# Patient Record
Sex: FEMALE | Race: WHITE | NOT HISPANIC OR LATINO | Employment: OTHER | URBAN - METROPOLITAN AREA
[De-identification: names, ages, dates, MRNs, and addresses within clinical notes are randomized per-mention and may not be internally consistent; named-entity substitution may affect disease eponyms.]

---

## 2017-01-24 ENCOUNTER — COMMUNICATION - HEALTHEAST (OUTPATIENT)
Dept: FAMILY MEDICINE | Facility: CLINIC | Age: 62
End: 2017-01-24

## 2017-01-24 DIAGNOSIS — E03.9 HYPOTHYROIDISM, UNSPECIFIED TYPE: ICD-10-CM

## 2017-02-23 ENCOUNTER — HOSPITAL ENCOUNTER (OUTPATIENT)
Dept: MAMMOGRAPHY | Facility: HOSPITAL | Age: 62
Discharge: HOME | End: 2017-02-23
Attending: FAMILY MEDICINE

## 2017-02-23 DIAGNOSIS — Z12.31 VISIT FOR SCREENING MAMMOGRAM: ICD-10-CM

## 2017-03-13 ENCOUNTER — RECORDS - HEALTHEAST (OUTPATIENT)
Dept: ADMINISTRATIVE | Facility: OTHER | Age: 62
End: 2017-03-13

## 2017-05-05 ENCOUNTER — COMMUNICATION - HEALTHEAST (OUTPATIENT)
Dept: FAMILY MEDICINE | Facility: CLINIC | Age: 62
End: 2017-05-05

## 2017-05-05 DIAGNOSIS — E03.9 HYPOTHYROIDISM, UNSPECIFIED TYPE: ICD-10-CM

## 2017-05-09 ENCOUNTER — COMMUNICATION - HEALTHEAST (OUTPATIENT)
Dept: FAMILY MEDICINE | Facility: CLINIC | Age: 62
End: 2017-05-09

## 2017-07-11 ENCOUNTER — COMMUNICATION - HEALTHEAST (OUTPATIENT)
Dept: FAMILY MEDICINE | Facility: CLINIC | Age: 62
End: 2017-07-11

## 2017-07-11 DIAGNOSIS — E03.9 HYPOTHYROIDISM, UNSPECIFIED TYPE: ICD-10-CM

## 2017-08-10 ENCOUNTER — COMMUNICATION - HEALTHEAST (OUTPATIENT)
Dept: FAMILY MEDICINE | Facility: CLINIC | Age: 62
End: 2017-08-10

## 2017-08-10 ENCOUNTER — OFFICE VISIT - HEALTHEAST (OUTPATIENT)
Dept: FAMILY MEDICINE | Facility: CLINIC | Age: 62
End: 2017-08-10

## 2017-08-10 DIAGNOSIS — E03.9 HYPOTHYROIDISM: ICD-10-CM

## 2017-08-10 DIAGNOSIS — G56.01 CARPAL TUNNEL SYNDROME OF RIGHT WRIST: ICD-10-CM

## 2017-08-10 DIAGNOSIS — G43.909 MIGRAINE: ICD-10-CM

## 2017-08-10 ASSESSMENT — MIFFLIN-ST. JEOR: SCORE: 1181.27

## 2017-08-14 ENCOUNTER — COMMUNICATION - HEALTHEAST (OUTPATIENT)
Dept: FAMILY MEDICINE | Facility: CLINIC | Age: 62
End: 2017-08-14

## 2017-09-18 ENCOUNTER — COMMUNICATION - HEALTHEAST (OUTPATIENT)
Dept: FAMILY MEDICINE | Facility: CLINIC | Age: 62
End: 2017-09-18

## 2017-11-09 ENCOUNTER — RECORDS - HEALTHEAST (OUTPATIENT)
Dept: ADMINISTRATIVE | Facility: OTHER | Age: 62
End: 2017-11-09

## 2017-11-14 ENCOUNTER — AMBULATORY - HEALTHEAST (OUTPATIENT)
Dept: FAMILY MEDICINE | Facility: CLINIC | Age: 62
End: 2017-11-14

## 2017-11-14 ENCOUNTER — AMBULATORY - HEALTHEAST (OUTPATIENT)
Dept: LAB | Facility: CLINIC | Age: 62
End: 2017-11-14

## 2017-11-14 DIAGNOSIS — E03.9 HYPOTHYROIDISM: ICD-10-CM

## 2017-11-16 ENCOUNTER — COMMUNICATION - HEALTHEAST (OUTPATIENT)
Dept: FAMILY MEDICINE | Facility: CLINIC | Age: 62
End: 2017-11-16

## 2017-11-16 DIAGNOSIS — G43.909 MIGRAINE: ICD-10-CM

## 2017-12-28 ENCOUNTER — COMMUNICATION - HEALTHEAST (OUTPATIENT)
Dept: FAMILY MEDICINE | Facility: CLINIC | Age: 62
End: 2017-12-28

## 2018-01-18 ENCOUNTER — OFFICE VISIT - HEALTHEAST (OUTPATIENT)
Dept: FAMILY MEDICINE | Facility: CLINIC | Age: 63
End: 2018-01-18

## 2018-01-18 DIAGNOSIS — G56.01 RIGHT CARPAL TUNNEL SYNDROME: ICD-10-CM

## 2018-01-18 DIAGNOSIS — G43.909 MIGRAINE: ICD-10-CM

## 2018-01-18 DIAGNOSIS — E03.9 HYPOTHYROIDISM: ICD-10-CM

## 2018-01-18 DIAGNOSIS — Z01.818 PREOP EXAMINATION: ICD-10-CM

## 2018-01-18 LAB
HGB BLD-MCNC: 13.3 G/DL (ref 12–16)
TSH SERPL DL<=0.005 MIU/L-ACNC: 0.32 UIU/ML (ref 0.3–5)

## 2018-01-18 ASSESSMENT — MIFFLIN-ST. JEOR: SCORE: 1189.1

## 2018-01-19 LAB
ATRIAL RATE - MUSE: 65 BPM
DIASTOLIC BLOOD PRESSURE - MUSE: NORMAL MMHG
INTERPRETATION ECG - MUSE: NORMAL
P AXIS - MUSE: 40 DEGREES
PR INTERVAL - MUSE: 186 MS
QRS DURATION - MUSE: 84 MS
QT - MUSE: 404 MS
QTC - MUSE: 420 MS
R AXIS - MUSE: 21 DEGREES
SYSTOLIC BLOOD PRESSURE - MUSE: NORMAL MMHG
T AXIS - MUSE: 21 DEGREES
VENTRICULAR RATE- MUSE: 65 BPM

## 2018-01-22 ENCOUNTER — RECORDS - HEALTHEAST (OUTPATIENT)
Dept: ADMINISTRATIVE | Facility: OTHER | Age: 63
End: 2018-01-22

## 2018-02-01 ENCOUNTER — RECORDS - HEALTHEAST (OUTPATIENT)
Dept: ADMINISTRATIVE | Facility: OTHER | Age: 63
End: 2018-02-01

## 2018-02-07 ENCOUNTER — COMMUNICATION - HEALTHEAST (OUTPATIENT)
Dept: FAMILY MEDICINE | Facility: CLINIC | Age: 63
End: 2018-02-07

## 2018-04-06 ENCOUNTER — COMMUNICATION - HEALTHEAST (OUTPATIENT)
Dept: FAMILY MEDICINE | Facility: CLINIC | Age: 63
End: 2018-04-06

## 2018-04-06 DIAGNOSIS — E03.9 HYPOTHYROIDISM: ICD-10-CM

## 2018-04-12 ENCOUNTER — RECORDS - HEALTHEAST (OUTPATIENT)
Dept: ADMINISTRATIVE | Facility: OTHER | Age: 63
End: 2018-04-12

## 2018-05-03 ENCOUNTER — COMMUNICATION - HEALTHEAST (OUTPATIENT)
Dept: FAMILY MEDICINE | Facility: CLINIC | Age: 63
End: 2018-05-03

## 2018-05-03 ENCOUNTER — COMMUNICATION - HEALTHEAST (OUTPATIENT)
Dept: SCHEDULING | Facility: CLINIC | Age: 63
End: 2018-05-03

## 2018-06-15 ENCOUNTER — RECORDS - HEALTHEAST (OUTPATIENT)
Dept: ADMINISTRATIVE | Facility: OTHER | Age: 63
End: 2018-06-15

## 2018-06-18 ENCOUNTER — RECORDS - HEALTHEAST (OUTPATIENT)
Dept: ADMINISTRATIVE | Facility: OTHER | Age: 63
End: 2018-06-18

## 2018-07-31 ENCOUNTER — COMMUNICATION - HEALTHEAST (OUTPATIENT)
Dept: FAMILY MEDICINE | Facility: CLINIC | Age: 63
End: 2018-07-31

## 2018-07-31 DIAGNOSIS — E03.9 HYPOTHYROIDISM: ICD-10-CM

## 2019-01-08 ENCOUNTER — COMMUNICATION - HEALTHEAST (OUTPATIENT)
Dept: FAMILY MEDICINE | Facility: CLINIC | Age: 64
End: 2019-01-08

## 2019-01-08 DIAGNOSIS — E03.9 HYPOTHYROIDISM: ICD-10-CM

## 2019-03-14 ENCOUNTER — COMMUNICATION - HEALTHEAST (OUTPATIENT)
Dept: FAMILY MEDICINE | Facility: CLINIC | Age: 64
End: 2019-03-14

## 2019-03-31 ENCOUNTER — COMMUNICATION - HEALTHEAST (OUTPATIENT)
Dept: FAMILY MEDICINE | Facility: CLINIC | Age: 64
End: 2019-03-31

## 2019-03-31 DIAGNOSIS — E03.9 HYPOTHYROIDISM: ICD-10-CM

## 2019-04-19 ENCOUNTER — HOSPITAL ENCOUNTER (OUTPATIENT)
Dept: MAMMOGRAPHY | Facility: CLINIC | Age: 64
Discharge: HOME | End: 2019-04-19
Attending: FAMILY MEDICINE

## 2019-04-19 DIAGNOSIS — Z12.31 VISIT FOR SCREENING MAMMOGRAM: ICD-10-CM

## 2019-12-03 ENCOUNTER — COMMUNICATION - HEALTHEAST (OUTPATIENT)
Dept: FAMILY MEDICINE | Facility: CLINIC | Age: 64
End: 2019-12-03

## 2019-12-03 DIAGNOSIS — G43.909 MIGRAINE WITHOUT STATUS MIGRAINOSUS, NOT INTRACTABLE, UNSPECIFIED MIGRAINE TYPE: ICD-10-CM

## 2020-03-23 ENCOUNTER — COMMUNICATION - HEALTHEAST (OUTPATIENT)
Dept: FAMILY MEDICINE | Facility: CLINIC | Age: 65
End: 2020-03-23

## 2020-03-23 DIAGNOSIS — E03.9 HYPOTHYROIDISM: ICD-10-CM

## 2020-04-16 ENCOUNTER — OFFICE VISIT - HEALTHEAST (OUTPATIENT)
Dept: FAMILY MEDICINE | Facility: CLINIC | Age: 65
End: 2020-04-16

## 2020-04-16 DIAGNOSIS — J06.9 VIRAL URI: ICD-10-CM

## 2020-04-16 DIAGNOSIS — J02.9 ACUTE SORE THROAT: ICD-10-CM

## 2020-04-21 ENCOUNTER — COMMUNICATION - HEALTHEAST (OUTPATIENT)
Dept: FAMILY MEDICINE | Facility: CLINIC | Age: 65
End: 2020-04-21

## 2020-04-22 ENCOUNTER — OFFICE VISIT - HEALTHEAST (OUTPATIENT)
Dept: FAMILY MEDICINE | Facility: CLINIC | Age: 65
End: 2020-04-22

## 2020-04-22 DIAGNOSIS — J01.90 SUBACUTE SINUSITIS, UNSPECIFIED LOCATION: ICD-10-CM

## 2020-04-22 RX ORDER — BENZONATATE 100 MG/1
100 CAPSULE ORAL EVERY 6 HOURS PRN
Qty: 30 CAPSULE | Refills: 0 | Status: SHIPPED | OUTPATIENT
Start: 2020-04-22 | End: 2021-12-01

## 2020-06-13 ENCOUNTER — COMMUNICATION - HEALTHEAST (OUTPATIENT)
Dept: FAMILY MEDICINE | Facility: CLINIC | Age: 65
End: 2020-06-13

## 2020-06-13 DIAGNOSIS — E03.9 HYPOTHYROIDISM: ICD-10-CM

## 2020-06-16 ENCOUNTER — RECORDS - HEALTHEAST (OUTPATIENT)
Dept: ADMINISTRATIVE | Facility: OTHER | Age: 65
End: 2020-06-16

## 2020-06-18 ENCOUNTER — AMBULATORY - HEALTHEAST (OUTPATIENT)
Dept: LAB | Facility: CLINIC | Age: 65
End: 2020-06-18

## 2020-06-18 DIAGNOSIS — E03.9 HYPOTHYROIDISM: ICD-10-CM

## 2020-06-18 LAB
T4 FREE SERPL-MCNC: 1.1 NG/DL (ref 0.7–1.8)
TSH SERPL DL<=0.005 MIU/L-ACNC: 0.06 UIU/ML (ref 0.3–5)

## 2020-06-24 ENCOUNTER — AMBULATORY - HEALTHEAST (OUTPATIENT)
Dept: FAMILY MEDICINE | Facility: CLINIC | Age: 65
End: 2020-06-24

## 2020-06-24 DIAGNOSIS — E03.9 HYPOTHYROIDISM: ICD-10-CM

## 2020-07-10 ENCOUNTER — COMMUNICATION - HEALTHEAST (OUTPATIENT)
Dept: FAMILY MEDICINE | Facility: CLINIC | Age: 65
End: 2020-07-10

## 2020-09-02 ENCOUNTER — COMMUNICATION - HEALTHEAST (OUTPATIENT)
Dept: FAMILY MEDICINE | Facility: CLINIC | Age: 65
End: 2020-09-02

## 2020-09-02 DIAGNOSIS — G43.909 MIGRAINE WITHOUT STATUS MIGRAINOSUS, NOT INTRACTABLE, UNSPECIFIED MIGRAINE TYPE: ICD-10-CM

## 2020-09-03 RX ORDER — SUMATRIPTAN 100 MG/1
TABLET, FILM COATED ORAL
Qty: 27 TABLET | Refills: 6 | Status: SHIPPED | OUTPATIENT
Start: 2020-09-03

## 2020-09-30 ENCOUNTER — COMMUNICATION - HEALTHEAST (OUTPATIENT)
Dept: FAMILY MEDICINE | Facility: CLINIC | Age: 65
End: 2020-09-30

## 2020-09-30 DIAGNOSIS — E03.9 HYPOTHYROIDISM: ICD-10-CM

## 2020-10-01 ENCOUNTER — COMMUNICATION - HEALTHEAST (OUTPATIENT)
Dept: LAB | Facility: CLINIC | Age: 65
End: 2020-10-01

## 2020-10-01 ENCOUNTER — AMBULATORY - HEALTHEAST (OUTPATIENT)
Dept: LAB | Facility: CLINIC | Age: 65
End: 2020-10-01

## 2020-10-01 DIAGNOSIS — E03.9 HYPOTHYROIDISM: ICD-10-CM

## 2020-10-01 LAB
T4 FREE SERPL-MCNC: 1.1 NG/DL (ref 0.7–1.8)
TSH SERPL DL<=0.005 MIU/L-ACNC: 0.07 UIU/ML (ref 0.3–5)

## 2020-10-04 ENCOUNTER — AMBULATORY - HEALTHEAST (OUTPATIENT)
Dept: FAMILY MEDICINE | Facility: CLINIC | Age: 65
End: 2020-10-04

## 2020-10-04 DIAGNOSIS — E03.9 HYPOTHYROIDISM: ICD-10-CM

## 2020-10-06 ENCOUNTER — COMMUNICATION - HEALTHEAST (OUTPATIENT)
Dept: FAMILY MEDICINE | Facility: CLINIC | Age: 65
End: 2020-10-06

## 2020-11-18 ENCOUNTER — AMBULATORY - HEALTHEAST (OUTPATIENT)
Dept: LAB | Facility: CLINIC | Age: 65
End: 2020-11-18

## 2020-11-18 DIAGNOSIS — E03.9 HYPOTHYROIDISM: ICD-10-CM

## 2020-11-18 LAB — TSH SERPL DL<=0.005 MIU/L-ACNC: 0.54 UIU/ML (ref 0.3–5)

## 2020-12-11 ENCOUNTER — COMMUNICATION - HEALTHEAST (OUTPATIENT)
Dept: FAMILY MEDICINE | Facility: CLINIC | Age: 65
End: 2020-12-11

## 2020-12-11 DIAGNOSIS — E03.9 HYPOTHYROIDISM: ICD-10-CM

## 2021-05-18 ENCOUNTER — AMBULATORY - HEALTHEAST (OUTPATIENT)
Dept: NURSING | Facility: CLINIC | Age: 66
End: 2021-05-18

## 2021-05-24 ENCOUNTER — RECORDS - HEALTHEAST (OUTPATIENT)
Dept: ADMINISTRATIVE | Facility: CLINIC | Age: 66
End: 2021-05-24

## 2021-05-25 ENCOUNTER — RECORDS - HEALTHEAST (OUTPATIENT)
Dept: ADMINISTRATIVE | Facility: CLINIC | Age: 66
End: 2021-05-25

## 2021-05-27 NOTE — TELEPHONE ENCOUNTER
RN cannot approve Refill Request    RN can NOT refill this medication PCP messaged that patient is overdue for Labs and Office Visit.       Laurel Webb, [facility] Triage/Med Refill 4/1/2019    Requested Prescriptions   Pending Prescriptions Disp Refills     SYNTHROID 112 mcg tablet [Pharmacy Med Name: SYNTHROID TAB 0.112MG] 90 tablet 3     Sig: TAKE 1 TABLET DAILY    Thyroid Hormones Protocol Failed - 3/31/2019  6:52 AM       Failed - Provider visit in past 12 months or next 3 months    Last office visit with prescriber/PCP: 8/10/2017 Summer Finch MD OR same dept: Visit date not found OR same specialty: 8/10/2017 Summer Finch MD  Last physical: 1/18/2018 Last MTM visit: Visit date not found   Next visit within 3 mo: Visit date not found  Next physical within 3 mo: Visit date not found  Prescriber OR PCP: Summer Finch MD  Last diagnosis associated with med order: 1. Hypothyroidism  - SYNTHROID 112 mcg tablet [Pharmacy Med Name: SYNTHROID TAB 0.112MG]; TAKE 1 TABLET DAILY  Dispense: 90 tablet; Refill: 0    If protocol passes may refill for 12 months if within 3 months of last provider visit (or a total of 15 months).            Failed - TSH on file in past 12 months for patient age 12 & older    TSH   Date Value Ref Range Status   01/18/2018 0.32 0.30 - 5.00 uIU/mL Final

## 2021-05-27 NOTE — TELEPHONE ENCOUNTER
Left message to call back for: pt  Information to relay to patient:  Left message to call and schedule annual physical with pcp

## 2021-05-28 ENCOUNTER — RECORDS - HEALTHEAST (OUTPATIENT)
Dept: ADMINISTRATIVE | Facility: CLINIC | Age: 66
End: 2021-05-28

## 2021-05-31 VITALS — WEIGHT: 148.38 LBS | HEIGHT: 62 IN | BODY MASS INDEX: 27.3 KG/M2

## 2021-05-31 VITALS — HEIGHT: 62 IN | WEIGHT: 150.1 LBS | BODY MASS INDEX: 27.62 KG/M2

## 2021-06-03 NOTE — TELEPHONE ENCOUNTER
RN cannot approve Refill Request    RN can NOT refill this medication PCP messaged that patient is overdue for Labs. Last office visit: 8/10/2017 Summer Finch MD Last Physical: 1/18/2018 Last MTM visit: Visit date not found Last visit same specialty: 8/10/2017 Summer Finch MD.  Next visit within 3 mo: Visit date not found  Next physical within 3 mo: Visit date not found      Юлия Beal, [facility] Triage/Med Refill 12/3/2019    Requested Prescriptions   Pending Prescriptions Disp Refills     SUMAtriptan (IMITREX) 100 MG tablet [Pharmacy Med Name: SUMATRIPTAN  TAB 100MG] 27 tablet 2     Sig: TAKE 1 TABLET EVERY 2 HOURSAS NEEDED FOR MIGRAINE     (MAXIMUM 2 TABLETS IN 24   HOURS)       Triptans Refill Protocol Failed - 12/3/2019  6:04 AM        Failed - PCP or prescribing provider visit in past 12 months       Last office visit with prescriber/PCP: 8/10/2017 Summer Finch MD OR same dept: Visit date not found OR same specialty: 8/10/2017 Summer Finch MD  Last physical: 1/18/2018 Last MTM visit: Visit date not found   Next visit within 3 mo: Visit date not found  Next physical within 3 mo: Visit date not found  Prescriber OR PCP: Summer Finch MD  Last diagnosis associated with med order: There are no diagnoses linked to this encounter.  If protocol passes may refill for 12 months if within 3 months of last provider visit (or a total of 15 months).

## 2021-06-06 ENCOUNTER — COMMUNICATION - HEALTHEAST (OUTPATIENT)
Dept: FAMILY MEDICINE | Facility: CLINIC | Age: 66
End: 2021-06-06

## 2021-06-06 DIAGNOSIS — E03.9 HYPOTHYROIDISM: ICD-10-CM

## 2021-06-07 RX ORDER — LEVOTHYROXINE SODIUM 88 MCG
TABLET ORAL
Qty: 90 TABLET | Refills: 1 | Status: SHIPPED | OUTPATIENT
Start: 2021-06-07 | End: 2021-11-13

## 2021-06-07 NOTE — TELEPHONE ENCOUNTER
Reason contacted:  Appointment   Information relayed:  Appointment scheduled tomorrow with Dr. Cavazos  Additional questions:  No  Further follow-up needed:  No  Okay to leave a detailed message:  No

## 2021-06-07 NOTE — TELEPHONE ENCOUNTER
RN cannot approve Refill Request    RN can NOT refill this medication Protocol failed and NO refill given. Last office visit: 8/10/2017 Summer Finch MD Last Physical: 1/18/2018 Last MTM visit: Visit date not found Last visit same specialty: 8/10/2017 Summer Finch MD.  Next visit within 3 mo: Visit date not found  Next physical within 3 mo: Visit date not found      Gloria Robledo, [facility] Triage/Med Refill 3/24/2020    Requested Prescriptions   Pending Prescriptions Disp Refills     SYNTHROID 112 mcg tablet [Pharmacy Med Name: SYNTHROID TAB 0.112MG] 90 tablet 3     Sig: TAKE 1 TABLET DAILY       Thyroid Hormones Protocol Failed - 3/23/2020  5:03 AM        Failed - Provider visit in past 12 months or next 3 months     Last office visit with prescriber/PCP: 8/10/2017 Summer Finch MD OR same dept: Visit date not found OR same specialty: 8/10/2017 Summer Finch MD  Last physical: 1/18/2018 Last MTM visit: Visit date not found   Next visit within 3 mo: Visit date not found  Next physical within 3 mo: Visit date not found  Prescriber OR PCP: Summer Finch MD  Last diagnosis associated with med order: 1. Hypothyroidism  - SYNTHROID 112 mcg tablet [Pharmacy Med Name: SYNTHROID TAB 0.112MG]; TAKE 1 TABLET DAILY  Dispense: 90 tablet; Refill: 3    If protocol passes may refill for 12 months if within 3 months of last provider visit (or a total of 15 months).             Failed - TSH on file in past 12 months for patient age 12 & older     TSH   Date Value Ref Range Status   01/18/2018 0.32 0.30 - 5.00 uIU/mL Final

## 2021-06-07 NOTE — TELEPHONE ENCOUNTER
Question following Office Visit  When did you see your provider: 04/16/20  What is your question: Patient states she still have symptoms of Sore Throat and she developed cough with out phlegm she feels like draining  down in her throat but no fever or shortness of breath patient took antihistamine OTC as provider suggested but it did not helped . Patient requested a call to discuss directly .  Okay to leave a detailed message: No

## 2021-06-07 NOTE — TELEPHONE ENCOUNTER
Left message to call back for: pt  Information to relay to patient:  Left message to call and schedule phone visit with dr friedman or Daniela .

## 2021-06-07 NOTE — PROGRESS NOTES
"Gloria Leo is a 64 y.o. female who is being evaluated via a billable telephone visit.      The patient has been notified of following:     \"This telephone visit will be conducted via a call between you and your physician/provider. We have found that certain health care needs can be provided without the need for a physical exam.  This service lets us provide the care you need with a short phone conversation.  If a prescription is necessary we can send it directly to your pharmacy.  If lab work is needed we can place an order for that and you can then stop by our lab to have the test done at a later time.    Telephone visits are billed at different rates depending on your insurance coverage. During this emergency period, for some insurers they may be billed the same as an in-person visit.  Please reach out to your insurance provider with any questions.    If during the course of the call the physician/provider feels a telephone visit is not appropriate, you will not be charged for this service.\"    Patient has given verbal consent to a Telephone visit? Yes    Patient would like to receive their AVS by AVS Preference: Mary.    Additional provider notes:   Assessment and Plan:     1. Acute sore throat     2. Viral URI       Differentials include viral illness, strep pharyngitis, allergic rhinitis, COVID-19.  Patient is afebrile and has some postnasal drainage and onset of sinus congestion.  Suspect viral illness.  Discussed symptomatic treatment including warm salt water gargles, acetaminophen, and antihistamine.  If fever develops or symptoms worsen, suggest follow-up.  Discussed that she could certainly schedule an appointment with our walk-in clinic if she does want strep testing.  Discussed potential for COVID 19 but she is afebrile and does not have a cough.  Recommend quarantine for 14 days if these symptoms arise.  She does not need a note for her workplace at this time.  She is content with the " plan.    Subjective:     Gloria is a 64 y.o. female presenting for a phone visit.  Patient developed a dry, scratchy throat yesterday.  Patient gargled with warm salt water.  This morning, she woke up and her throat felt more swollen.  She has some postnasal drainage and her head feels full.  She denies sinus congestion, cough, headache, stomachache, nausea, vomiting, fever.  She has not had any sneezing, but has had some itchy eyes.  She has not tried any over-the-counter products for her symptoms.  No one else around her is ill.  Patient works at a bank.  She has not had any COVID 19 exposure.    Review of Systems: A complete 14 point review of systems was obtained and is negative or as stated in the history of present illness.    Social History     Socioeconomic History     Marital status:      Spouse name: Kodak     Number of children: 3     Years of education: Not on file     Highest education level: Not on file   Occupational History     Occupation: Banker   Social Needs     Financial resource strain: Not on file     Food insecurity     Worry: Not on file     Inability: Not on file     Transportation needs     Medical: Not on file     Non-medical: Not on file   Tobacco Use     Smoking status: Never Smoker     Smokeless tobacco: Never Used   Substance and Sexual Activity     Alcohol use: Yes     Alcohol/week: 5.0 standard drinks     Types: 5 Glasses of wine per week     Drug use: No     Sexual activity: Not on file   Lifestyle     Physical activity     Days per week: Not on file     Minutes per session: Not on file     Stress: Not on file   Relationships     Social connections     Talks on phone: Not on file     Gets together: Not on file     Attends Jainism service: Not on file     Active member of club or organization: Not on file     Attends meetings of clubs or organizations: Not on file     Relationship status: Not on file     Intimate partner violence     Fear of current or ex partner: Not on  file     Emotionally abused: Not on file     Physically abused: Not on file     Forced sexual activity: Not on file   Other Topics Concern     Not on file   Social History Narrative     Not on file       Active Ambulatory Problems     Diagnosis Date Noted     Chronic Rhinitis      Migraine Headache      Resolved Ambulatory Problems     Diagnosis Date Noted     Contact Dermatitis      Seborrheic Keratosis      Symptomatic Menopause      Ganglion      Hypothyroidism      Lymphadenopathy      No Additional Past Medical History       Family History   Problem Relation Age of Onset     Breast cancer Paternal Grandmother 55     Breast cancer Maternal Aunt 55     Heart disease Maternal Grandmother      Breast cancer Paternal Aunt         had breast removed. not sure if it was cancer     Clotting disorder Neg Hx        Objective:     There were no vitals taken for this visit.    Patient sounds alert.  Her voice sounds as though she has some drainage her in her throat and sinus congestion.  She is not coughing and does not sound short of breath.     Phone call duration:  8 minutes    Argentina Richter, [facility]

## 2021-06-07 NOTE — PROGRESS NOTES
Assessment:     1. Subacute sinusitis, unspecified location  azithromycin (ZITHROMAX Z-FLAQUITO) 250 MG tablet    benzonatate (TESSALON PERLES) 100 MG capsule       Plan:     1. Subacute sinusitis, unspecified location    - azithromycin (ZITHROMAX Z-FLAQUITO) 250 MG tablet; Take 2 tablets (500 mg) on  Day 1,  followed by 1 tablet (250 mg) once daily on Days 2 through 5.  Dispense: 6 tablet; Refill: 0  - benzonatate (TESSALON PERLES) 100 MG capsule; Take 1 capsule (100 mg total) by mouth every 6 (six) hours as needed for cough.  Dispense: 30 capsule; Refill: 0      Subjective:   Patient with sinusitis who I did a telephone interview with while she was in the parking lot and I was here in the office.  The above medications were prescribed based on her symptomatology which has been subacute and medical decision making was to treat her    Review of Systems: A complete 14 point review of systems was obtained and is negative or as stated in the history of present illness.    No past medical history on file.  Family History   Problem Relation Age of Onset     Breast cancer Paternal Grandmother 55     Breast cancer Maternal Aunt 55     Heart disease Maternal Grandmother      Breast cancer Paternal Aunt         had breast removed. not sure if it was cancer     Clotting disorder Neg Hx      Past Surgical History:   Procedure Laterality Date     2016 knee arthroscopy      meniscus tear     AUGMENTATION MAMMAPLASTY Bilateral 1992     [facility] ENLARGE BREAST      Description: Breast Surgery Enlargement Procedure;  Recorded: 04/07/2008;     Social History     Tobacco Use     Smoking status: Never Smoker     Smokeless tobacco: Never Used   Substance Use Topics     Alcohol use: Yes     Alcohol/week: 5.0 standard drinks     Types: 5 Glasses of wine per week     Drug use: No         Objective:   There were no vitals taken for this visit.    General Appearance:  Normal  Head:  Normal  Ears: Normal  Eyes:  Normal  Nose:  Normal  Throat:  Normal  Neck:   Normal  Back:  Normal  Chest/Breast:Normal  Lungs:  Normal  Heart:  Normal  Abdomen:  Normal  Musculoskeletal:  Normal  Lymphatic:  Normal  Skin/Hair/Nails:  Normal  Neurologic:  Normal  Extremities:  Normal  Genitourinary: Normal  Pulses:  Normal           This note has been dictated using voice recognition software. Any grammatical or context distortions are unintentional and inherent to the the software.

## 2021-06-07 NOTE — TELEPHONE ENCOUNTER
Please assist her with scheduling a virtual visit with [facility] tonight or with a provider tomorrow for re-evaluation.  Thanks.

## 2021-06-07 NOTE — TELEPHONE ENCOUNTER
See below message    Per 4/16/2020 virtual visit:   Assessment and Plan:      1. Acute sore throat      2. Viral URI        Differentials include viral illness, strep pharyngitis, allergic rhinitis, COVID-19.  Patient is afebrile and has some postnasal drainage and onset of sinus congestion.  Suspect viral illness.  Discussed symptomatic treatment including warm salt water gargles, acetaminophen, and antihistamine.  If fever develops or symptoms worsen, suggest follow-up.  Discussed that she could certainly schedule an appointment with our walk-in clinic if she does want strep testing.  Discussed potential for COVID 19 but she is afebrile and does not have a cough.  Recommend quarantine for 14 days if these symptoms arise.  She does not need a note for her workplace at this time.  She is content with the plan.

## 2021-06-08 ENCOUNTER — AMBULATORY - HEALTHEAST (OUTPATIENT)
Dept: NURSING | Facility: CLINIC | Age: 66
End: 2021-06-08

## 2021-06-08 NOTE — TELEPHONE ENCOUNTER
Patient Returning Call  Reason for call:  Return call  Information relayed to patient:  Patient was informed of the message below.  Patient has additional questions:  Yes  If YES, what are your questions/concerns:  Patient stated that she just had a visit with Kayla Barba CNP in 4/2020 and end up with a $200 bill. Patient stated that she would prefer to just do a lab appointment for lab only and skip the virtual visit.  Okay to leave a detailed message?: Yes  [phone]

## 2021-06-08 NOTE — TELEPHONE ENCOUNTER
RN cannot approve Refill Request    RN can NOT refill this medication PCP messaged that patient is overdue for Labs and Office Visit. Last office visit: 8/2/2016 Kayla Barba CNP Last Physical: Visit date not found Last MTM visit: Visit date not found Last visit same specialty: 8/10/2017 Summer Finch MD.  Next visit within 3 mo: Visit date not found  Next physical within 3 mo: Visit date not found      Yeny Dubon, [facility] Triage/Med Refill 6/14/2020    Requested Prescriptions   Pending Prescriptions Disp Refills     SYNTHROID 112 mcg tablet [Pharmacy Med Name: SYNTHROID TAB 0.112MG] 90 tablet 0     Sig: TAKE 1 TABLET DAILY       Thyroid Hormones Protocol Failed - 6/13/2020 12:32 AM        Failed - Provider visit in past 12 months or next 3 months     Last office visit with prescriber/PCP: 8/2/2016 Kayla Barba CNP OR same dept: Visit date not found OR same specialty: 8/10/2017 Summer Finch MD  Last physical: Visit date not found Last MTM visit: Visit date not found   Next visit within 3 mo: Visit date not found  Next physical within 3 mo: Visit date not found  Prescriber OR PCP: Kayla Barba CNP  Last diagnosis associated with med order: 1. Hypothyroidism  - SYNTHROID 112 mcg tablet [Pharmacy Med Name: SYNTHROID TAB 0.112MG]; TAKE 1 TABLET DAILY  Dispense: 90 tablet; Refill: 0    If protocol passes may refill for 12 months if within 3 months of last provider visit (or a total of 15 months).             Failed - TSH on file in past 12 months for patient age 12 & older     TSH   Date Value Ref Range Status   01/18/2018 0.32 0.30 - 5.00 uIU/mL Final

## 2021-06-08 NOTE — TELEPHONE ENCOUNTER
Left message to call back for: pt/Gloria  Information to relay to patient:  KAITLIN for pt/Gloria to call bk to schedule a VV w/Kayla Barba CNP for follow up/thyroid

## 2021-06-09 NOTE — TELEPHONE ENCOUNTER
Medication Question or Clarification  Who is calling: patient  What medication are you calling about (include dose and sig)?:   levothyroxine (SYNTHROID, LEVOTHROID) 100 MCG tablet  100 mcg, Oral, DAILY   Who prescribed the medication?: Dr Finch  What is your question/concern?: Patient was confused.  The pharmacy she states did not contact her so she called to see if order was sent.  Writer shared it was sent on June 24. Writer validated it was right pharmacy.  She will call her pharmacy.   Requested Pharmacy: [id number] [id number]  Okay to leave a detailed message?: No call back needed.

## 2021-06-11 NOTE — TELEPHONE ENCOUNTER
Refill Approved    Rx renewed per Medication Renewal Policy. Medication was last renewed on 12/3/19.    Laurel Webb, [facility] Triage/Med Refill 9/3/2020     Requested Prescriptions   Pending Prescriptions Disp Refills     SUMAtriptan (IMITREX) 100 MG tablet 27 tablet 2       Triptans Refill Protocol Passed - 9/2/2020  7:31 AM        Passed - PCP or prescribing provider visit in past 12 months       Last office visit with prescriber/PCP: 8/10/2017 Summer Finch MD OR same dept: Visit date not found OR same specialty: 8/10/2017 Summer Finch MD  Last physical: 1/18/2018 Last MTM visit: Visit date not found   Next visit within 3 mo: Visit date not found  Next physical within 3 mo: Visit date not found  Prescriber OR PCP: Summer Finch MD  Last diagnosis associated with med order: 1. Migraine without status migrainosus, not intractable, unspecified migraine type  - SUMAtriptan (IMITREX) 100 MG tablet  Dispense: 27 tablet; Refill: 2    If protocol passes may refill for 12 months if within 3 months of last provider visit (or a total of 15 months).

## 2021-06-11 NOTE — TELEPHONE ENCOUNTER
Patient came in for thyroid labs today. Only has a week of pill's left, would like rx sent to pharmacy as soon as labs are verified.

## 2021-06-12 NOTE — TELEPHONE ENCOUNTER
----- Message from Summer Finch MD sent at 10/4/2020  2:25 PM CDT -----  Your thyroid is still overtreated.  Reduce dose of levothyroxine to 87 mcg daily, and let's recheck labs again in 6-8 weeks.

## 2021-06-12 NOTE — TELEPHONE ENCOUNTER
RN cannot approve Refill Request    RN can NOT refill this medication PCP to review.  Filled today.  Dosage does not match.. Last office visit: 8/10/2017 Summer Finch MD Last Physical: 1/18/2018 Last MTM visit: Visit date not found Last visit same specialty: 8/10/2017 Summer Finch MD.  Next visit within 3 mo: Visit date not found  Next physical within 3 mo: Visit date not found      Precious Hernandez, [facility] Triage/Med Refill 10/4/2020    Requested Prescriptions   Pending Prescriptions Disp Refills     SYNTHROID 112 mcg tablet [Pharmacy Med Name: SYNTHROID TAB 0.112MG] 90 tablet 0     Sig: TAKE 1 TABLET DAILY       Thyroid Hormones Protocol Passed - 9/30/2020  7:13 PM        Passed - Provider visit in past 12 months or next 3 months     Last office visit with prescriber/PCP: 8/10/2017 Summer Finch MD OR same dept: Visit date not found OR same specialty: 8/10/2017 Summer Finch MD  Last physical: 1/18/2018 Last MTM visit: Visit date not found   Next visit within 3 mo: Visit date not found  Next physical within 3 mo: Visit date not found  Prescriber OR PCP: Summer Finch MD  Last diagnosis associated with med order: 1. Hypothyroidism  - SYNTHROID 112 mcg tablet [Pharmacy Med Name: SYNTHROID TAB 0.112MG]; TAKE 1 TABLET DAILY  Dispense: 90 tablet; Refill: 0    If protocol passes may refill for 12 months if within 3 months of last provider visit (or a total of 15 months).             Passed - TSH on file in past 12 months for patient age 12 & older     TSH   Date Value Ref Range Status   10/01/2020 0.07 (L) 0.30 - 5.00 uIU/mL Final

## 2021-06-12 NOTE — TELEPHONE ENCOUNTER
Labs reviewed, message sent via eCert, and results message sent to notify patient to reduce dose to 87 mcg and recheck in 6-8 weeks.  New Rx levothyroxine sent.  CANDY

## 2021-06-12 NOTE — PROGRESS NOTES
Assessment/Plan:     1. Hypothyroidism  Clinically euthyroid.  Will check thyroid cascade today..  Continue levothyroxine daily.  - Thyroid Cascade  - T4, Free  - levothyroxine (SYNTHROID, LEVOTHROID) 100 MCG tablet; TAKE 1 TABLET DAILY  Dispense: 90 tablet; Refill: 0  - Thyroid Cascade; Future    2. Carpal tunnel syndrome of right wrist  Encouraged continued use of overnight brace, will refer to hand orthopedics.      3. Migraine Headaches  Continue propranolol.  Continue sumatriptan as needed.      Subjective:      Gloria Leo is a 61 y.o. female presented to clinic today upon request of the clinic for thyroid testing as it has been over a year since her last thyroid test.  Overall feels that her energy level is stable.  Denies any changes in weight, denies heat or cold intolerance.    History of right wrist carpal tunnel syndrome that had previously improved with a wrist brace at night, however no longer is working.  She notes numbness in her thumb and index finger first thing in the morning, worse with recent painting.  Intermittently feels like there is some weakness as well.    History of migraine headaches, weather changes seem to trigger it on occasion.  Remains on propranolol twice daily, taking one half tab per dose.    Current Outpatient Prescriptions   Medication Sig Dispense Refill     levothyroxine (SYNTHROID, LEVOTHROID) 100 MCG tablet TAKE 1 TABLET DAILY 90 tablet 0     propranolol (INDERAL) 40 MG tablet Take 1 tablet (40 mg total) by mouth 2 (two) times a day. To prevent migraine headache. (Patient taking differently: Take 40 mg by mouth 2 (two) times a day. One half tablet twice daily. To prevent migraine headache.) 180 tablet 2     SUMAtriptan (IMITREX) 100 MG tablet Take 1 tablet (100 mg total) by mouth every 2 (two) hours as needed for migraine (Maxiumum 2 tabs in 24 hours). 30 tablet 2     triamcinolone (KENALOG) 0.1 % ointment Apply topically 2 (two) times a day.       No current  "facility-administered medications for this visit.        Past Medical History, Family History, and Social History reviewed.  No past medical history on file.  Past Surgical History:   Procedure Laterality Date     AUGMENTATION MAMMAPLASTY Bilateral 1992     [facility] ENLARGE BREAST      Description: Breast Surgery Enlargement Procedure;  Recorded: 04/07/2008;     Diltiazem and Topamax [topiramate]  Family History   Problem Relation Age of Onset     Breast cancer Paternal Grandmother 55     Breast cancer Maternal Aunt 55     Social History     Social History     Marital status:      Spouse name: N/A     Number of children: N/A     Years of education: N/A     Occupational History     Not on file.     Social History Main Topics     Smoking status: Never Smoker     Smokeless tobacco: Not on file     Alcohol use Not on file     Drug use: Not on file     Sexual activity: Not on file     Other Topics Concern     Not on file     Social History Narrative         Review of systems is as stated in HPI, and the remainder of the 10 system review is otherwise negative.    Objective:     Vitals:    08/10/17 0855   BP: 102/70   Patient Site: Left Arm   Patient Position: Sitting   Cuff Size: Adult Regular   Pulse: 62   Resp: 20   Temp: 97.6  F (36.4  C)   TempSrc: Oral   Weight: 148 lb 6 oz (67.3 kg)   Height: 5' 2\" (1.575 m)    Body mass index is 27.14 kg/(m^2).    Alert female.  Neck supple without lymphadenopathy or thyroid nodules.  Heart with regular rate and rhythm.  Lungs clear.  5 out of 5 strength throughout her upper extremities with the exception of 4 out of 5 strength with index to thumb opposition on her right-hand side.  She has no atrophy of the muscles.      This note has been dictated using voice recognition software. Any grammatical or context distortions are unintentional and inherent to the the software.   "

## 2021-06-12 NOTE — TELEPHONE ENCOUNTER
Lm for pt re message below.  No return call needed unless pt has questions or concerns. Clinic number left for return call if she has any questions.

## 2021-06-13 NOTE — TELEPHONE ENCOUNTER
Refill Approved    Rx renewed per Medication Renewal Policy. Medication was last renewed on 10/4/20, last OV 4/22/20.    Yeny Dubon, [facility] Triage/Med Refill 12/13/2020     Requested Prescriptions   Pending Prescriptions Disp Refills     levothyroxine (SYNTHROID, LEVOTHROID) 88 MCG tablet 90 tablet 1     Sig: Take 1 tablet (88 mcg total) by mouth daily.       Thyroid Hormones Protocol Passed - 12/11/2020 11:46 AM        Passed - Provider visit in past 12 months or next 3 months     Last office visit with prescriber/PCP: 8/10/2017 Summer Finch MD OR same dept: Visit date not found OR same specialty: 8/10/2017 Summer Finch MD  Last physical: 1/18/2018 Last MTM visit: Visit date not found   Next visit within 3 mo: Visit date not found  Next physical within 3 mo: Visit date not found  Prescriber OR PCP: Summer Finch MD  Last diagnosis associated with med order: 1. Hypothyroidism  - levothyroxine (SYNTHROID, LEVOTHROID) 88 MCG tablet; Take 1 tablet (88 mcg total) by mouth daily.  Dispense: 90 tablet; Refill: 1    If protocol passes may refill for 12 months if within 3 months of last provider visit (or a total of 15 months).             Passed - TSH on file in past 12 months for patient age 12 & older     TSH   Date Value Ref Range Status   11/18/2020 0.54 0.30 - 5.00 uIU/mL Final

## 2021-06-15 NOTE — PROGRESS NOTES
Assessment/Plan:     1. Preop examination  No significant surgical risks or contraindications identified.  She may proceed with surgery without further clinical clarification or evaluation.  May proceed with choice of anesthesia.  - Electrocardiogram Perform and Read  - Hemoglobin    2. Right carpal tunnel syndrome  To proceed with surgical treatment as noted.    3. Hypothyroidism  Clinically euthyroid, will continue levothyroxine.  Will obtain follow-up thyroid cascade today.  - Thyroid Villalba    4. Migraine  Doing well on current dose of propranolol, sumatriptan available as needed.    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  She has had ongoing difficulties with numbness and tingling in her right worse than her left hand, previously improved with night splints and now no longer finding benefit with the night splints.  Noticing loss of strength as well, dropping items.  This is disrupting her activities of daily living and her ability to complete work, requiring surgical treatment.    History of hypothyroidism, doing well on levothyroxine with recent adjustments in dose, due for follow-up thyroid cascade today.  Migraines have been stable on propranolol with sumatriptan available as needed for flares.    Recent upper respiratory infection that resulted in headache that lasted several days.  She still has a bit of mild nasal congestion near baseline, cough and other symptoms entirely resolved.    Current Outpatient Prescriptions   Medication Sig Dispense Refill     levothyroxine (SYNTHROID, LEVOTHROID) 112 MCG tablet TAKE 1 TABLET DAILY 90 tablet 1     propranolol (INDERAL) 40 MG tablet Take 1 tablet (40 mg total) by mouth 2 (two) times a day. To prevent  migraine headache. 180 tablet 2     SUMAtriptan (IMITREX) 100 MG tablet Take 1 tablet (100 mg total) by mouth every 2 (two) hours as needed for migraine (Maxiumum 2 tabs in 24 hours). 30 tablet 2     triamcinolone (KENALOG) 0.1 % ointment Apply topically 2 (two) times a day.       No current facility-administered medications for this visit.        Allergies   Allergen Reactions     Diltiazem      Topamax [Topiramate]        Immunization History   Administered Date(s) Administered     DT (pediatric) 04/24/2000     Hep A, historic 04/07/2008, 04/13/2009     Td,adult,historic,unspecified 04/07/2008     Tdap 04/07/2008       Patient Active Problem List   Diagnosis     Chronic Rhinitis     Migraine Headache       No past medical history on file.    Social History     Social History     Marital status:      Spouse name: Kodak     Number of children: 3     Years of education: N/A     Occupational History     Banker      Social History Main Topics     Smoking status: Never Smoker     Smokeless tobacco: Never Used     Alcohol use 3.0 oz/week     5 Glasses of wine per week     Drug use: No     Sexual activity: Not on file     Other Topics Concern     Not on file     Social History Narrative       Past Surgical History:   Procedure Laterality Date     2016 knee arthroscopy      meniscus tear     AUGMENTATION MAMMAPLASTY Bilateral 1992     [facility] ENLARGE BREAST      Description: Breast Surgery Enlargement Procedure;  Recorded: 04/07/2008;       History of Present Illness  Recent Health  Fever: no  Chills: yes  Fatigue: no  Chest Pain: no  Cough: no  Dyspnea: no  Urinary Frequency: no  Nausea: no  Vomiting: no  Diarrhea: no  Abdominal Pain: no  Easy Bruising: no  Lower Extremity Swelling: no  Poor Exercise Tolerance: no    Pertinent History  Prior Anesthesia: yes  Previous Anesthesia Reaction:  yes, slow to emerge from anesthesia  Diabetes: no  Cardiovascular Disease: no  Pulmonary Disease: no  Renal Disease: no  GI Disease:  "no  Sleep Apnea: no  Thromboembolic Problems: no  Clotting Disorder: no  Bleeding Disorder: no  Transfusion Reaction: no  Impaired Immunity: no  Steroid use in the last 6 months: no  Frequent Aspirin use: no    After surgery, the patient plans to recover at home with family.    Review of Systems  Pertinent items are noted in HPI.  A 12 point comprehensive review of systems was negative except as noted.          Objective:         Vitals:    01/18/18 0834   BP: 110/68   Pulse: 67   Resp: 16   Temp: 97.8  F (36.6  C)   TempSrc: Oral   SpO2: 98%   Weight: 150 lb 1.6 oz (68.1 kg)   Height: 5' 2\" (1.575 m)       Physical Exam:  General Appearance: Alert, cooperative, no distress, appears stated age  Head: Normocephalic, without obvious abnormality, atraumatic  Eyes: PERRL, conjunctiva/corneas clear, EOM's intact  Ears: Normal TM's and external ear canals, both ears  Nose: Nares normal, septum midline,mucosa normal, no drainage  Throat: Lips, mucosa, and tongue normal; teeth and gums normal  Neck: Supple, symmetrical, trachea midline, no adenopathy;  thyroid: not enlarged, symmetric  Lungs: Clear to auscultation bilaterally, respirations unlabored  Heart: Regular rate and rhythm, S1 and S2 normal, no murmur, rub, or gallop,   Abdomen: Soft, non-tender, bowel sounds active all four quadrants,  no masses, no organomegaly  Extremities: Extremities normal, atraumatic, no cyanosis or edema  Skin: Skin color, texture, turgor normal, no rashes or lesions  Lymph nodes: Cervical, supraclavicular, and axillary nodes normal  Neurologic: Normal       I ordered and personally reviewed a 12-lead EKG which reveals normal sinus rhythm, no ischemic or arrhythmic changes.    Physical on 01/18/2018   Component Date Value Ref Range Status     VENTRICULAR RATE 01/18/2018 65  BPM Incomplete     ATRIAL RATE 01/18/2018 65  BPM Incomplete     P-R INTERVAL 01/18/2018 186  ms Incomplete     QRS DURATION 01/18/2018 84  ms Incomplete     Q-T INTERVAL " 01/18/2018 404  ms Incomplete     QTC CALCULATION (BEZET) 01/18/2018 420  ms Incomplete     P Axis 01/18/2018 40  degrees Incomplete     R AXIS 01/18/2018 21  degrees Incomplete     T AXIS 01/18/2018 21  degrees Incomplete     MUSE DIAGNOSIS 01/18/2018    Incomplete                    Value:Normal sinus rhythm  Normal ECG  When compared with ECG of 19-AUG-2016 07:45,  No significant change was found       Hemoglobin 01/18/2018 13.3  12.0 - 16.0 g/dL Final

## 2021-06-22 NOTE — TELEPHONE ENCOUNTER
Due to be seen and labs     Medication was last renewed on 7/31/18.    Laurel Webb, [facility] Triage/Med Refill 1/9/2019     Requested Prescriptions   Pending Prescriptions Disp Refills     SYNTHROID 112 mcg tablet [Pharmacy Med Name: SYNTHROID TAB 0.112MG] 90 tablet 1     Sig: TAKE 1 TABLET DAILY    Thyroid Hormones Protocol Passed - 1/8/2019  3:55 AM       Passed - Provider visit in past 12 months or next 3 months    Last office visit with prescriber/PCP: 8/10/2017 Summer Finch MD OR same dept: Visit date not found OR same specialty: 8/10/2017 Summer Finch MD  Last physical: 1/18/2018 Last MTM visit: Visit date not found   Next visit within 3 mo: Visit date not found  Next physical within 3 mo: Visit date not found  Prescriber OR PCP: Summer Finch MD  Last diagnosis associated with med order: 1. Hypothyroidism  - SYNTHROID 112 mcg tablet [Pharmacy Med Name: SYNTHROID TAB 0.112MG]; TAKE 1 TABLET DAILY  Dispense: 90 tablet; Refill: 1    If protocol passes may refill for 12 months if within 3 months of last provider visit (or a total of 15 months).            Passed - TSH on file in past 12 months for patient age 12 & older    TSH   Date Value Ref Range Status   01/18/2018 0.32 0.30 - 5.00 uIU/mL Final

## 2021-06-24 NOTE — TELEPHONE ENCOUNTER
Medication Request  Medication name: Sumatriptan  Pharmacy Name and Location:  [facility]  Reason for request:  Migraines  When did you use medication last?:  Two weeks ago  Patient offered appointment:  No  Okay to leave a detailed message: yes  [phone]

## 2021-06-25 NOTE — TELEPHONE ENCOUNTER
RN cannot approve Refill Request    RN can NOT refill this medication medication not on med list. Last office visit: 8/10/2017 Summer Finch MD Last Physical: 1/18/2018 Last MTM visit: Visit date not found Last visit same specialty: 8/10/2017 Summer Finch MD.  Next visit within 3 mo: Visit date not found  Next physical within 3 mo: Visit date not found   Last OV 1/18/18 PE    Kayla Levy, [facility] Triage/Med Refill 3/18/2019    Requested Prescriptions   Pending Prescriptions Disp Refills     SUMAtriptan (IMITREX) 100 MG tablet 30 tablet 2     Sig: Take 1 tablet (100 mg total) by mouth every 2 (two) hours as needed for migraine (Maximum 2 tabs in 24 hours).    Triptans Refill Protocol Failed - 3/14/2019  9:24 AM       Failed - PCP or prescribing provider visit in past 12 months      Last office visit with prescriber/PCP: 8/10/2017 Summer Finch MD OR same dept: Visit date not found OR same specialty: 8/10/2017 Summer Finch MD  Last physical: 1/18/2018 Last MTM visit: Visit date not found   Next visit within 3 mo: Visit date not found  Next physical within 3 mo: Visit date not found  Prescriber OR PCP: Summer Finch MD  Last diagnosis associated with med order: There are no diagnoses linked to this encounter.  If protocol passes may refill for 12 months if within 3 months of last provider visit (or a total of 15 months).

## 2021-06-25 NOTE — TELEPHONE ENCOUNTER
RN cannot approve Refill Request    RN can NOT refill this medication PCP messaged that patient is overdue for Office Visit. Last office visit: 8/10/2017 Summer Finch MD Last Physical: 1/18/2018 Last MTM visit: Visit date not found Last visit same specialty: 8/10/2017 Summer Finch MD.  Next visit within 3 mo: Visit date not found  Next physical within 3 mo: Visit date not found      Yeny Dubon, [facility] Triage/Med Refill 6/6/2021    Requested Prescriptions   Pending Prescriptions Disp Refills     SYNTHROID 88 mcg tablet [Pharmacy Med Name: SYNTHROID TAB 0.088MG] 90 tablet 1     Sig: TAKE 1 TABLET DAILY       Thyroid Hormones Protocol Failed - 6/6/2021  7:31 AM        Failed - Provider visit in past 12 months or next 3 months     Last office visit with prescriber/PCP: 8/10/2017 Summer Finch MD OR same dept: Visit date not found OR same specialty: 8/10/2017 Summer Finch MD  Last physical: 1/18/2018 Last MTM visit: Visit date not found   Next visit within 3 mo: Visit date not found  Next physical within 3 mo: Visit date not found  Prescriber OR PCP: Summer Finch MD  Last diagnosis associated with med order: 1. Hypothyroidism  - SYNTHROID 88 mcg tablet [Pharmacy Med Name: SYNTHROID TAB 0.088MG]; TAKE 1 TABLET DAILY  Dispense: 90 tablet; Refill: 1    If protocol passes may refill for 12 months if within 3 months of last provider visit (or a total of 15 months).             Passed - TSH on file in past 12 months for patient age 12 & older     TSH   Date Value Ref Range Status   11/18/2020 0.54 0.30 - 5.00 uIU/mL Final

## 2021-06-27 ENCOUNTER — HEALTH MAINTENANCE LETTER (OUTPATIENT)
Age: 66
End: 2021-06-27

## 2021-07-13 ENCOUNTER — RECORDS - HEALTHEAST (OUTPATIENT)
Dept: ADMINISTRATIVE | Facility: CLINIC | Age: 66
End: 2021-07-13

## 2021-07-21 ENCOUNTER — RECORDS - HEALTHEAST (OUTPATIENT)
Dept: ADMINISTRATIVE | Facility: CLINIC | Age: 66
End: 2021-07-21

## 2021-10-17 ENCOUNTER — HEALTH MAINTENANCE LETTER (OUTPATIENT)
Age: 66
End: 2021-10-17

## 2021-11-12 DIAGNOSIS — E03.9 HYPOTHYROIDISM: ICD-10-CM

## 2021-11-13 RX ORDER — LEVOTHYROXINE SODIUM 88 MCG
TABLET ORAL
Qty: 90 TABLET | Refills: 0 | Status: SHIPPED | OUTPATIENT
Start: 2021-11-13 | End: 2022-02-18

## 2021-12-01 ENCOUNTER — OFFICE VISIT (OUTPATIENT)
Dept: FAMILY MEDICINE | Facility: CLINIC | Age: 66
End: 2021-12-01

## 2021-12-01 VITALS
WEIGHT: 136.1 LBS | HEART RATE: 78 BPM | HEIGHT: 61 IN | OXYGEN SATURATION: 99 % | RESPIRATION RATE: 16 BRPM | BODY MASS INDEX: 25.69 KG/M2 | DIASTOLIC BLOOD PRESSURE: 80 MMHG | SYSTOLIC BLOOD PRESSURE: 122 MMHG | TEMPERATURE: 98 F

## 2021-12-01 DIAGNOSIS — Z00.00 ROUTINE PHYSICAL EXAMINATION: Primary | ICD-10-CM

## 2021-12-01 DIAGNOSIS — G43.909 MIGRAINE WITHOUT STATUS MIGRAINOSUS, NOT INTRACTABLE, UNSPECIFIED MIGRAINE TYPE: ICD-10-CM

## 2021-12-01 DIAGNOSIS — E06.3 HYPOTHYROIDISM DUE TO HASHIMOTO'S THYROIDITIS: ICD-10-CM

## 2021-12-01 PROCEDURE — G0123 SCREEN CERV/VAG THIN LAYER: HCPCS | Performed by: FAMILY MEDICINE

## 2021-12-01 PROCEDURE — 87624 HPV HI-RISK TYP POOLED RSLT: CPT | Performed by: FAMILY MEDICINE

## 2021-12-01 PROCEDURE — 99396 PREV VISIT EST AGE 40-64: CPT | Mod: 25 | Performed by: FAMILY MEDICINE

## 2021-12-01 PROCEDURE — 90715 TDAP VACCINE 7 YRS/> IM: CPT | Performed by: FAMILY MEDICINE

## 2021-12-01 PROCEDURE — 90471 IMMUNIZATION ADMIN: CPT | Performed by: FAMILY MEDICINE

## 2021-12-01 ASSESSMENT — MIFFLIN-ST. JEOR: SCORE: 1108.69

## 2021-12-01 NOTE — PROGRESS NOTES
Assessment/Plan:     Routine physical examination  Encouraged healthy lifestyle habits including regular exercise, healthy eating habits, and adequate calcium and vitamin D intake.  She will return for future fasting lab only visit to obtain her fasting glucose and fasting lipids.  She has donated blood and therefore is negative for HIV and hepatitis C.  Will await screening Pap smear results.  Order placed for screening mammogram.  Referral placed for dermatology skin exam.  Patient provided guarding advanced healthcare directives.  Advised future Covid booster.  Tdap administered today.  She declines seasonal influenza vaccine.  Encourage consideration of Shingrix.  - GLUCOSE; Future  - Lipid panel reflex to direct LDL Fasting; Future  - PAP screen with HPV - recommended age 30 - 65 years  - REVIEW OF HEALTH MAINTENANCE PROTOCOL ORDERS  - MA SCREENING DIGITAL BILAT - Future  (s+30); Future  - Adult Dermatology Referral; Future    Migraine without status migrainosus, not intractable, unspecified migraine type  Overall stable.  Continue sumatriptan as needed.    Hypothyroidism due to Hashimoto's thyroiditis  Clinically euthyroid.  Continue levothyroxine.  Will check future TSH.  - TSH with free T4 reflex; Future       Patient Instructions     Preventive Health Recommendations  Female Ages 50 - 64    Yearly exam: See your health care provider every year in order to  o Review health changes.   o Discuss preventive care.    o Review your medicines if your doctor has prescribed any.      Get a Pap test every three years (unless you have an abnormal result and your provider advises testing more often).    If you get Pap tests with HPV test, you only need to test every 5 years, unless you have an abnormal result.     You do not need a Pap test if your uterus was removed (hysterectomy) and you have not had cancer.    You should be tested each year for STDs (sexually transmitted diseases) if you're at risk.     Have a  mammogram every 1 to 2 years.    Have a colonoscopy at age 50, or have a yearly FIT test (stool test). These exams screen for colon cancer.      Have a cholesterol test every 5 years, or more often if advised.    Have a diabetes test (fasting glucose) every three years. If you are at risk for diabetes, you should have this test more often.     If you are at risk for osteoporosis (brittle bone disease), think about having a bone density scan (DEXA).    Shots: Get a flu shot each year. Get a tetanus shot every 10 years.    Nutrition:     Eat at least 5 servings of fruits and vegetables each day.    Eat whole-grain bread, whole-wheat pasta and brown rice instead of white grains and rice.    Get adequate Calcium and Vitamin D.     Lifestyle    Exercise at least 150 minutes a week (30 minutes a day, 5 days a week). This will help you control your weight and prevent disease.    Limit alcohol to one drink per day.    No smoking.     Wear sunscreen to prevent skin cancer.     See your dentist every six months for an exam and cleaning.    See your eye doctor every 1 to 2 years.         Return in about 1 year (around 12/1/2022) for Annual Preventive Care Visit; fasting lab only visit at your convenience.         Subjective:     Gloria Leo is a 66 year old female who presents for an annual exam.     She has been doing well.  Occasional migraines, sumatriptan is helpful.  Chronic rhinitis is stable.  Remains euthyroid on levothyroxine with no recent missed doses.  She is noted some mild skin changes, interested in seeing a dermatologist for routine skin screening.  She is due for a Pap smear and mammogram.  Colonoscopy up-to-date.  She is agreeable to Tdap today.  She is considering Shingrix.  She is not interested in seasonal influenza vaccine.  He has previously donated blood without known HIV or hepatitis C status.    Healthy Habits:     Getting at least 3 servings of Calcium per day:  NO    Bi-annual eye exam:  Yes     Dental care twice a year:  Yes    Sleep apnea or symptoms of sleep apnea:  None    Diet:  Regular (no restrictions)    Frequency of exercise:  2-3 days/week    Duration of exercise:  30-45 minutes    Taking medications regularly:  Yes    Medication side effects:  None    PHQ-2 Total Score: 0    Additional concerns today:  Yes      Healthy Habits:   Healthy Diet: Yes  Regular Exercise: Yes  Sunscreen Use: Yes  Dental Visits Regularly: Yes  Seat Belt: Yes  Domestic abuse:   No    Health Maintenance reviewed:  Lipid Profile: Due, not fasting  Glucose Screen: Due, not fasting  Colonoscopy: 6/15/2018, follow-up 5 years  Mammogram: 4/19/2019, family history    Gynecologic History  No LMP recorded.  Contraception: post menopausal status  Last Pap: 11/4/2013. Results were: Normal, HPV negative        Immunization History   Administered Date(s) Administered     COVID-19,PF,Pfizer (12+ Yrs) 05/18/2021, 06/08/2021     DT (PEDS <7y) 04/24/2000     HepA-Adult 04/07/2008, 04/13/2009     Tdap (Adacel,Boostrix) 04/07/2008         Current Outpatient Medications   Medication Sig Dispense Refill     SUMAtriptan (IMITREX) 100 MG tablet [SUMATRIPTAN (IMITREX) 100 MG TABLET] TAKE 1 TABLET EVERY 2 HOURSAS NEEDED FOR MIGRAINE     (MAXIMUM 2 TABLETS IN 24   HOURS) 27 tablet 6     SYNTHROID 88 MCG tablet TAKE 1 TABLET DAILY 90 tablet 0     No past medical history on file.  Past Surgical History:   Procedure Laterality Date     MAMMOPLASTY AUGMENTATION Bilateral 1992     OTHER SURGICAL HISTORY      2016 knee arthroscopymeniscus tear     ZZC ENLARGE BREAST      Description: Breast Surgery Enlargement Procedure;  Recorded: 04/07/2008;     Diltiazem and Topamax [topiramate]  Family History   Problem Relation Age of Onset     Breast Cancer Paternal Grandmother 55.00     Breast Cancer Maternal Aunt 55.00     Heart Disease Maternal Grandmother      Breast Cancer Paternal Aunt         had breast removed. not sure if it was cancer     Clotting  "Disorder No family hx of      Social History     Socioeconomic History     Marital status:      Spouse name: Not on file     Number of children: 3     Years of education: Not on file     Highest education level: Not on file   Occupational History     Not on file   Tobacco Use     Smoking status: Never Smoker     Smokeless tobacco: Never Used   Substance and Sexual Activity     Alcohol use: Yes     Alcohol/week: 5.0 standard drinks     Drug use: No     Sexual activity: Not on file   Other Topics Concern     Not on file   Social History Narrative     Not on file     Social Determinants of Health     Financial Resource Strain: Not on file   Food Insecurity: Not on file   Transportation Needs: Not on file   Physical Activity: Not on file   Stress: Not on file   Social Connections: Not on file   Intimate Partner Violence: Not on file   Housing Stability: Not on file       Review of Systems  General:  Denies problem  Eyes: Denies problem  Ears/Nose/Throat: Denies problem  Cardiovascular: Denies problem  Respiratory:  Denies problem  Gastrointestinal:  Denies problem   Genitourinary: Denies problem  Musculoskeletal:  Denies problem       Objective:     /80   Pulse 78   Temp 98  F (36.7  C) (Tympanic)   Resp 16   Ht 1.556 m (5' 1.25\")   Wt 61.7 kg (136 lb 1.6 oz)   SpO2 99%   BMI 25.51 kg/m     Body mass index is 25.51 kg/m .     Physical Examination: General appearance - alert, well appearing, and in no distress, oriented to person, place, and time and normal appearing weight  Mental status - alert, oriented to person, place, and time, normal mood, behavior, speech, dress, motor activity, and thought processes  Eyes - pupils equal and reactive, extraocular eye movements intact  Ears - bilateral TM's and external ear canals normal  Nose - normal and patent, no erythema, discharge or polyps  Mouth - mucous membranes moist, pharynx normal without lesions  Neck - supple, no significant " adenopathy  Lymphatics - no palpable lymphadenopathy, no hepatosplenomegaly  Chest - clear to auscultation, no wheezes, rales or rhonchi, symmetric air entry  Heart - normal rate, regular rhythm, normal S1, S2, no murmurs, rubs, clicks or gallops  Abdomen - soft, nontender, nondistended, no masses or organomegaly  Breasts - breasts appear normal, no suspicious masses, no skin or nipple changes or axillary nodes  Neurological - alert, oriented, normal speech, no focal findings or movement disorder noted  Musculoskeletal - no joint tenderness, deformity or swelling  Extremities - peripheral pulses normal, no pedal edema, no clubbing or cyanosis  Skin - normal coloration and turgor, no rashes, no suspicious skin lesions noted        No visits with results within 14 Day(s) from this visit.   Latest known visit with results is:   [facility]   Component Date Value Ref Range Status     TSH 11/18/2020 0.54  0.30 - 5.00 uIU/mL Final

## 2021-12-07 LAB
HUMAN PAPILLOMA VIRUS 16 DNA: NEGATIVE
HUMAN PAPILLOMA VIRUS 18 DNA: NEGATIVE
HUMAN PAPILLOMA VIRUS FINAL DIAGNOSIS: NORMAL
HUMAN PAPILLOMA VIRUS OTHER HR: NEGATIVE

## 2021-12-08 LAB
BKR LAB AP GYN ADEQUACY: NORMAL
BKR LAB AP GYN INTERPRETATION: NORMAL
BKR LAB AP HPV REFLEX: NORMAL
BKR LAB AP PREVIOUS ABNORMAL: NORMAL
PATH REPORT.COMMENTS IMP SPEC: NORMAL
PATH REPORT.COMMENTS IMP SPEC: NORMAL
PATH REPORT.RELEVANT HX SPEC: NORMAL

## 2022-02-15 ENCOUNTER — LAB (OUTPATIENT)
Dept: LAB | Facility: CLINIC | Age: 67
End: 2022-02-15
Payer: COMMERCIAL

## 2022-02-15 DIAGNOSIS — E06.3 HYPOTHYROIDISM DUE TO HASHIMOTO'S THYROIDITIS: ICD-10-CM

## 2022-02-15 DIAGNOSIS — Z00.00 ROUTINE PHYSICAL EXAMINATION: ICD-10-CM

## 2022-02-15 LAB
CHOLEST SERPL-MCNC: 249 MG/DL
FASTING STATUS PATIENT QL REPORTED: NO
FASTING STATUS PATIENT QL REPORTED: NO
GLUCOSE BLD-MCNC: 90 MG/DL (ref 70–125)
HDLC SERPL-MCNC: 73 MG/DL
LDLC SERPL CALC-MCNC: 156 MG/DL
T4 FREE SERPL-MCNC: 1.21 NG/DL (ref 0.7–1.8)
TRIGL SERPL-MCNC: 101 MG/DL
TSH SERPL DL<=0.005 MIU/L-ACNC: 0.12 UIU/ML (ref 0.3–5)

## 2022-02-15 PROCEDURE — 80061 LIPID PANEL: CPT

## 2022-02-15 PROCEDURE — 36415 COLL VENOUS BLD VENIPUNCTURE: CPT

## 2022-02-15 PROCEDURE — 84443 ASSAY THYROID STIM HORMONE: CPT

## 2022-02-15 PROCEDURE — 84439 ASSAY OF FREE THYROXINE: CPT

## 2022-02-15 PROCEDURE — 82947 ASSAY GLUCOSE BLOOD QUANT: CPT

## 2022-02-18 ENCOUNTER — TELEPHONE (OUTPATIENT)
Dept: FAMILY MEDICINE | Facility: CLINIC | Age: 67
End: 2022-02-18
Payer: COMMERCIAL

## 2022-02-18 DIAGNOSIS — E06.3 HYPOTHYROIDISM DUE TO HASHIMOTO'S THYROIDITIS: Primary | ICD-10-CM

## 2022-02-18 RX ORDER — LEVOTHYROXINE SODIUM 75 UG/1
75 TABLET ORAL DAILY
Qty: 90 TABLET | Refills: 1 | Status: SHIPPED | OUTPATIENT
Start: 2022-02-18 | End: 2022-08-18

## 2022-02-18 NOTE — RESULT ENCOUNTER NOTE
Your thyroid is again mildly overtreated.  I recommend we reduce your levothyroxine dose to 75 mcg daily and then recheck your thyroid in 6 to 8 weeks at a lab only visit.  Normal fasting blood sugar.  Your cholesterol remains a little elevated, however it has improved significantly since last year.

## 2022-02-18 NOTE — TELEPHONE ENCOUNTER
Reason for Call:  Other prescription    Detailed comments: Due to change for her Thyroid medicine. Patient needs new prescription written up by PCP. She is now with a new pharmacy.   Please send prescription information to [facility], [address] [place] [address] [phone]  She will be making this her new permanent location.     Phone Number Patient can be reached at: Cell number on file:    Telephone Information:   Mobile [phone]       Best Time: Anytime    Can we leave a detailed message on this number? YES    Call taken on 2/18/2022 at 3:52 PM by Sulaiman Naidu

## 2022-04-26 ENCOUNTER — LAB (OUTPATIENT)
Dept: LAB | Facility: CLINIC | Age: 67
End: 2022-04-26
Payer: COMMERCIAL

## 2022-04-26 DIAGNOSIS — E06.3 HYPOTHYROIDISM DUE TO HASHIMOTO'S THYROIDITIS: ICD-10-CM

## 2022-04-26 LAB — TSH SERPL DL<=0.005 MIU/L-ACNC: 0.38 UIU/ML (ref 0.3–5)

## 2022-04-26 PROCEDURE — 36415 COLL VENOUS BLD VENIPUNCTURE: CPT

## 2022-04-26 PROCEDURE — 84443 ASSAY THYROID STIM HORMONE: CPT

## 2022-07-24 ENCOUNTER — HEALTH MAINTENANCE LETTER (OUTPATIENT)
Age: 67
End: 2022-07-24

## 2022-08-17 DIAGNOSIS — E06.3 HYPOTHYROIDISM DUE TO HASHIMOTO'S THYROIDITIS: ICD-10-CM

## 2022-08-18 RX ORDER — LEVOTHYROXINE SODIUM 75 UG/1
TABLET ORAL
Qty: 90 TABLET | Refills: 1 | Status: SHIPPED | OUTPATIENT
Start: 2022-08-18 | End: 2023-02-24

## 2022-08-18 NOTE — TELEPHONE ENCOUNTER
"Last Written Prescription Date:  2/18/2022  Last Fill Quantity: 90,  # refills: 1   Last office visit provider:  12/1/2021     Requested Prescriptions   Pending Prescriptions Disp Refills     levothyroxine (SYNTHROID/LEVOTHROID) 75 MCG tablet [Pharmacy Med Name: LEVOTHYROXINE 0.075MG (75MCG) TABS] 90 tablet 1     Sig: TAKE 1 TABLET(75 MCG) BY MOUTH DAILY       Thyroid Protocol Passed - 8/17/2022  3:45 PM        Passed - Patient is 12 years or older        Passed - Recent (12 mo) or future (30 days) visit within the authorizing provider's specialty     Patient has had an office visit with the authorizing provider or a provider within the authorizing providers department within the previous 12 mos or has a future within next 30 days. See \"Patient Info\" tab in inbasket, or \"Choose Columns\" in Meds & Orders section of the refill encounter.              Passed - Medication is active on med list        Passed - Normal TSH on file in past 12 months     Recent Labs   Lab Test 04/26/22  1129   TSH 0.38              Passed - No active pregnancy on record     If patient is pregnant or has had a positive pregnancy test, please check TSH.          Passed - No positive pregnancy test in past 12 months     If patient is pregnant or has had a positive pregnancy test, please check TSH.               Abbi Pennington RN 08/18/22 12:19 AM  "

## 2022-09-07 ENCOUNTER — HOSPITAL ENCOUNTER (OUTPATIENT)
Dept: MAMMOGRAPHY | Facility: CLINIC | Age: 67
Discharge: HOME | End: 2022-09-07
Attending: FAMILY MEDICINE | Admitting: FAMILY MEDICINE
Payer: COMMERCIAL

## 2022-09-07 DIAGNOSIS — Z00.00 ROUTINE PHYSICAL EXAMINATION: ICD-10-CM

## 2022-09-07 PROCEDURE — 77063 BREAST TOMOSYNTHESIS BI: CPT

## 2022-10-02 ENCOUNTER — HEALTH MAINTENANCE LETTER (OUTPATIENT)
Age: 67
End: 2022-10-02

## 2023-02-11 ENCOUNTER — HEALTH MAINTENANCE LETTER (OUTPATIENT)
Age: 68
End: 2023-02-11

## 2023-03-06 ENCOUNTER — OFFICE VISIT (OUTPATIENT)
Dept: FAMILY MEDICINE | Facility: CLINIC | Age: 68
End: 2023-03-06
Payer: COMMERCIAL

## 2023-03-06 VITALS
WEIGHT: 131.1 LBS | HEART RATE: 75 BPM | DIASTOLIC BLOOD PRESSURE: 91 MMHG | HEIGHT: 61 IN | BODY MASS INDEX: 24.75 KG/M2 | RESPIRATION RATE: 10 BRPM | SYSTOLIC BLOOD PRESSURE: 153 MMHG | OXYGEN SATURATION: 97 % | TEMPERATURE: 97.7 F

## 2023-03-06 DIAGNOSIS — J31.0 CHRONIC RHINITIS: ICD-10-CM

## 2023-03-06 DIAGNOSIS — G43.909 MIGRAINE WITHOUT STATUS MIGRAINOSUS, NOT INTRACTABLE, UNSPECIFIED MIGRAINE TYPE: ICD-10-CM

## 2023-03-06 DIAGNOSIS — Z00.00 WELCOME TO MEDICARE PREVENTIVE VISIT: Primary | ICD-10-CM

## 2023-03-06 DIAGNOSIS — F51.04 CHRONIC INSOMNIA: ICD-10-CM

## 2023-03-06 DIAGNOSIS — Z13.1 SCREENING FOR DIABETES MELLITUS: ICD-10-CM

## 2023-03-06 DIAGNOSIS — E06.3 HYPOTHYROIDISM DUE TO HASHIMOTO'S THYROIDITIS: ICD-10-CM

## 2023-03-06 DIAGNOSIS — Z78.0 ASYMPTOMATIC POSTMENOPAUSAL STATUS: ICD-10-CM

## 2023-03-06 DIAGNOSIS — Z98.82 HISTORY OF BREAST AUGMENTATION: ICD-10-CM

## 2023-03-06 LAB
CHOLEST SERPL-MCNC: 281 MG/DL
HDLC SERPL-MCNC: 94 MG/DL
LDLC SERPL CALC-MCNC: 172 MG/DL
NONHDLC SERPL-MCNC: 187 MG/DL
TRIGL SERPL-MCNC: 75 MG/DL
TSH SERPL DL<=0.005 MIU/L-ACNC: 0.53 UIU/ML (ref 0.3–4.2)

## 2023-03-06 PROCEDURE — 99213 OFFICE O/P EST LOW 20 MIN: CPT | Mod: 25 | Performed by: FAMILY MEDICINE

## 2023-03-06 PROCEDURE — 84443 ASSAY THYROID STIM HORMONE: CPT | Performed by: FAMILY MEDICINE

## 2023-03-06 PROCEDURE — 80061 LIPID PANEL: CPT | Performed by: FAMILY MEDICINE

## 2023-03-06 PROCEDURE — G0402 INITIAL PREVENTIVE EXAM: HCPCS | Performed by: FAMILY MEDICINE

## 2023-03-06 PROCEDURE — 36415 COLL VENOUS BLD VENIPUNCTURE: CPT | Performed by: FAMILY MEDICINE

## 2023-03-06 RX ORDER — LEVOTHYROXINE SODIUM 75 UG/1
75 TABLET ORAL DAILY
Qty: 90 TABLET | Refills: 0 | Status: SHIPPED | OUTPATIENT
Start: 2023-03-06 | End: 2023-05-31

## 2023-03-06 ASSESSMENT — ENCOUNTER SYMPTOMS
MYALGIAS: 0
DIZZINESS: 0
EYE PAIN: 0
HEMATOCHEZIA: 0
NAUSEA: 0
NERVOUS/ANXIOUS: 0
JOINT SWELLING: 0
FEVER: 0
ARTHRALGIAS: 1
SORE THROAT: 0
HEMATURIA: 0
COUGH: 0
PALPITATIONS: 0
CHILLS: 0
HEADACHES: 0
FREQUENCY: 0
PARESTHESIAS: 0
WEAKNESS: 0
DYSURIA: 0
SHORTNESS OF BREATH: 0
BREAST MASS: 0
CONSTIPATION: 1
ABDOMINAL PAIN: 0
HEARTBURN: 0
DIARRHEA: 0

## 2023-03-06 ASSESSMENT — PAIN SCALES - GENERAL: PAINLEVEL: NO PAIN (0)

## 2023-03-06 ASSESSMENT — ACTIVITIES OF DAILY LIVING (ADL): CURRENT_FUNCTION: NO ASSISTANCE NEEDED

## 2023-03-06 NOTE — PATIENT INSTRUCTIONS
Patient Education   Personalized Prevention Plan  You are due for the preventive services outlined below.  Your care team is available to assist you in scheduling these services.  If you have already completed any of these items, please share that information with your care team to update in your medical record.  Health Maintenance Due   Topic Date Due     Osteoporosis Screening  Never done     Zoster (Shingles) Vaccine (1 of 2) Never done     COVID-19 Vaccine (3 - Booster for Pfizer series) 08/03/2021     Flu Vaccine (1) Never done     ANNUAL REVIEW OF HM ORDERS  12/01/2022     Pneumococcal Vaccine (1 - PCV) 10/13/2022

## 2023-03-06 NOTE — PROGRESS NOTES
"SUBJECTIVE:   Gloria is a 67 year old who presents for Preventive Visit.    Patient has been advised of split billing requirements and indicates understanding: Yes  Are you in the first 12 months of your Medicare coverage?  Yes,  Visual Acuity:  Right Eye: 20/25   Left Eye: 20/25  Both Eyes: 20/25    Overall doing well.  She has been retired now for a year and is been working on various projects at her home and her cabin.  Remains on levothyroxine and management of hypothyroidism.  Her migraines are fewer and far between, managed with some triptan.  Stable rhinitis.  Notes that she is taking Tylenol PM to sleep most nights.  She has been more physically active, noting anterior knee pain with lunges and squats, working to increase her activity level.  Feels that she is doing well with p.o. intake.  No longer needed Pap smears.  Mammogram up-to-date.  Near due for colonoscopy.  Has not previously had a bone density scan.  She is not interested in flu shot or COVID-vaccine.  History of saline breast implants 30 years ago, more recently noting a sense of more firmness in her right outer eye, interested in further evaluation of this.    Healthy Habits:     In general, how would you rate your overall health?  Good    Frequency of exercise:  4-5 days/week    Duration of exercise:  30-45 minutes    Do you usually eat at least 4 servings of fruit and vegetables a day, include whole grains    & fiber and avoid regularly eating high fat or \"junk\" foods?  No    Taking medications regularly:  Yes    Ability to successfully perform activities of daily living:  No assistance needed    Home Safety:  No safety concerns identified    Hearing Impairment:  Need to ask people to speak up or repeat themselves    In the past 6 months, have you been bothered by leaking of urine?  No    In general, how would you rate your overall mental or emotional health?  Good      PHQ-2 Total Score: 0    Additional concerns today:  No      Have you " ever done Advance Care Planning? (For example, a Health Directive, POLST, or a discussion with a medical provider or your loved ones about your wishes): No, advance care planning information given to patient to review.  Advanced care planning was discussed at today's visit.      Fall risk  Fallen 2 or more times in the past year?: No  Any fall with injury in the past year?: No  Cognitive Screening   1) Repeat 3 items (Leader, Season, Table)    2) Clock draw: NORMAL  3) 3 item recall: Recalls 3 objects  Results: 3 items recalled: COGNITIVE IMPAIRMENT LESS LIKELY    Mini-CogTM Copyright S [place]. Licensed by the author for use in [facility]; reprinted with permission (sonorman@[facility]). All rights reserved.      Do you have sleep apnea, excessive snoring or daytime drowsiness?: no    Reviewed and updated as needed this visit by clinical staff   Tobacco  Allergies  Meds  Problems             Reviewed and updated as needed this visit by Provider    Allergies  Meds  Problems            Social History     Tobacco Use     Smoking status: Never     Smokeless tobacco: Never   Substance Use Topics     Alcohol use: Yes     Alcohol/week: 5.0 standard drinks         Alcohol Use 3/6/2023   Prescreen: >3 drinks/day or >7 drinks/week? No       Current providers sharing in care for this patient include:   Patient Care Team:  Summer Finch MD as PCP - General (Family Practice)  Summer Finch MD as Assigned PCP    The following health maintenance items are reviewed in Epic and correct as of today:  Health Maintenance   Topic Date Due     DEXA  Never done     ZOSTER IMMUNIZATION (1 of 2) Never done     COVID-19 Vaccine (3 - Booster for Pfizer series) 08/03/2021     INFLUENZA VACCINE (1) Never done     Pneumococcal Vaccine: 65+ Years (1 - PCV) 10/13/2022     TSH W/FREE T4 REFLEX  04/26/2023     COLORECTAL CANCER SCREENING  06/15/2023     MAMMO SCREENING  09/07/2023     MEDICARE ANNUAL WELLNESS VISIT  03/06/2024      ANNUAL REVIEW OF HM ORDERS  03/06/2024     FALL RISK ASSESSMENT  03/06/2024     ADVANCE CARE PLANNING  12/01/2026     LIPID  02/15/2027     DTAP/TDAP/TD IMMUNIZATION (3 - Td or Tdap) 12/01/2031     PHQ-2 (once per calendar year)  Completed     HEPATITIS C SCREENING  Addressed     HIV SCREENING  Addressed     IPV IMMUNIZATION  Aged Out     MENINGITIS IMMUNIZATION  Aged Out     PAP  Discontinued     Lab work is in process  Labs reviewed in EPIC  BP Readings from Last 3 Encounters:   03/06/23 (!) 153/91   12/01/21 122/80    Wt Readings from Last 3 Encounters:   03/06/23 59.5 kg (131 lb 1.6 oz)   12/01/21 61.7 kg (136 lb 1.6 oz)   01/18/18 68.1 kg (150 lb 1.6 oz)                  Patient Active Problem List   Diagnosis     Chronic Rhinitis     Migraine Headache     Hypothyroidism due to Hashimoto's thyroiditis     Past Surgical History:   Procedure Laterality Date     MAMMOPLASTY AUGMENTATION Bilateral 1992     OTHER SURGICAL HISTORY      2016 knee arthroscopymeniscus tear     ZZC ENLARGE BREAST      Description: Breast Surgery Enlargement Procedure;  Recorded: 04/07/2008;       Social History     Tobacco Use     Smoking status: Never     Smokeless tobacco: Never   Substance Use Topics     Alcohol use: Yes     Alcohol/week: 5.0 standard drinks     Family History   Problem Relation Age of Onset     Breast Cancer Paternal Grandmother 55.00     Breast Cancer Maternal Aunt 55.00     Heart Disease Maternal Grandmother      Breast Cancer Paternal Aunt         had breast removed. not sure if it was cancer     Clotting Disorder No family hx of          Current Outpatient Medications   Medication Sig Dispense Refill     levothyroxine (SYNTHROID/LEVOTHROID) 75 MCG tablet Take 1 tablet (75 mcg) by mouth daily 90 tablet 0     SUMAtriptan (IMITREX) 100 MG tablet [SUMATRIPTAN (IMITREX) 100 MG TABLET] TAKE 1 TABLET EVERY 2 HOURSAS NEEDED FOR MIGRAINE     (MAXIMUM 2 TABLETS IN 24   HOURS) 27 tablet 6     Allergies   Allergen  "Reactions     Diltiazem Unknown     Topamax [Topiramate] Unknown       FHS-7:   Breast CA Risk Assessment (FHS-7) 12/1/2021 9/7/2022 3/6/2023   Did any of your first-degree relatives have breast or ovarian cancer? Yes Yes Yes   Did any of your relatives have bilateral breast cancer? Unknown Unknown Unknown   Did any man in your family have breast cancer? No No No   Did any woman in your family have breast and ovarian cancer? Yes No Yes   Did any woman in your family have breast cancer before age 50 y? No No No   Do you have 2 or more relatives with breast and/or ovarian cancer? Yes No Yes   Do you have 2 or more relatives with breast and/or bowel cancer? Yes No Yes       Pertinent mammograms are reviewed under the imaging tab.    Review of Systems   Constitutional: Negative for chills and fever.   HENT: Negative for congestion, ear pain, hearing loss and sore throat.    Eyes: Negative for pain and visual disturbance.   Respiratory: Negative for cough and shortness of breath.    Cardiovascular: Negative for chest pain, palpitations and peripheral edema.   Gastrointestinal: Positive for constipation. Negative for abdominal pain, diarrhea, heartburn, hematochezia and nausea.   Breasts:  Negative for tenderness, breast mass and discharge.   Genitourinary: Negative for dysuria, frequency, genital sores, hematuria, pelvic pain, urgency, vaginal bleeding and vaginal discharge.   Musculoskeletal: Positive for arthralgias. Negative for joint swelling and myalgias.   Skin: Negative for rash.   Neurological: Negative for dizziness, weakness, headaches and paresthesias.   Psychiatric/Behavioral: Negative for mood changes. The patient is not nervous/anxious.        OBJECTIVE:   BP (!) 153/91   Pulse 75   Temp 97.7  F (36.5  C) (Oral)   Resp 10   Ht 1.549 m (5' 0.98\")   Wt 59.5 kg (131 lb 1.6 oz)   SpO2 97%   BMI 24.78 kg/m   Estimated body mass index is 24.78 kg/m  as calculated from the following:    Height as of this " "encounter: 1.549 m (5' 0.98\").    Weight as of this encounter: 59.5 kg (131 lb 1.6 oz).  Physical Exam  GENERAL APPEARANCE: healthy, alert and no distress  EYES: Eyes grossly normal to inspection, PERRL and conjunctivae and sclerae normal  HENT: ear canals and TM's normal, nose and mouth without ulcers or lesions, oropharynx clear and oral mucous membranes moist  NECK: no adenopathy, no asymmetry, masses, or scars and thyroid normal to palpation  RESP: lungs clear to auscultation - no rales, rhonchi or wheezes  BREAST: normal without masses, tenderness or nipple discharge and no palpable axillary masses or adenopathy; right breast notable for an increase in firmness in the right outer lower quadrant no overlying skin changes.  No warmth or induration.  CV: regular rate and rhythm, normal S1 S2, no S3 or S4, no murmur, click or rub, no peripheral edema and peripheral pulses strong  ABDOMEN: soft, nontender, no hepatosplenomegaly, no masses and bowel sounds normal  MS: no musculoskeletal defects are noted and gait is age appropriate without ataxia  SKIN: no suspicious lesions or rashes  NEURO: Normal strength and tone, sensory exam grossly normal, mentation intact and speech normal  PSYCH: mentation appears normal and affect normal/bright      ASSESSMENT / PLAN:     Welcome to Medicare preventive visit  At today's visit, we discussed lifestyle interventions to promote self-management and wellness, including maintenance of a healthy weight, healthy diet, regular physical activity and exercise, and falls prevention.  We will check nonfasting lipids today.  She is not fasting so therefore we do not check glucose.  Unfortunately A1c is not covered for diabetes screening today, that her future screening though she is low risk for diabetes.  Advised mammogram every 1 to 2 years.  No longer needed Pap smears.  She is up-to-date with colon cancer screening, will due for this again in June.  Order placed for screening bone " density scan.  Advised consideration of Shingrix and PCV 20 pending insurance coverage.  - Lipid panel reflex to direct LDL Non-fasting; Future  - Lipid panel reflex to direct LDL Non-fasting    Hypothyroidism due to Hashimoto's thyroiditis  Clinically euthyroid, will check TSH today.  Continue levothyroxine.  - levothyroxine (SYNTHROID/LEVOTHROID) 75 MCG tablet; Take 1 tablet (75 mcg) by mouth daily  - TSH; Future  - TSH    Migraine without status migrainosus, not intractable, unspecified migraine type  Stable with sumatriptan hand.    Chronic rhinitis  Stable.    Screening for diabetes mellitus  She is not fasting, unfortunately A1c is not covered for screening visits.  We will plan to screen for diabetes next year without glucose.    Asymptomatic postmenopausal status  Order placed for screening bone density scan.  - DEXA HIP/PELVIS/SPINE - Future; Future    History of breast augmentation  With changes in right breast, most comfortable in having her assessed by breast plastic surgeon who will be more versed in patient's implants and the potential risks.  Referral placed.  - Adult Plastic Surgery  Referral; Future    Chronic insomnia  Encourage sleep hygiene measures and attempt to discontinue Tylenol PM for sleep purposes.      COUNSELING:  Reviewed preventive health counseling, as reflected in patient instructions        She reports that she has never smoked. She has never used smokeless tobacco.      Appropriate preventive services were discussed with this patient, including applicable screening as appropriate for cardiovascular disease, diabetes, osteopenia/osteoporosis, and glaucoma.  As appropriate for age/gender, discussed screening for colorectal cancer, prostate cancer, breast cancer, and cervical cancer. Checklist reviewing preventive services available has been given to the patient.    Reviewed patients plan of care and provided an AVS. The Basic Care Plan (routine screening as documented in  Health Maintenance) for Gloria meets the Care Plan requirement. This Care Plan has been established and reviewed with the Patient.          Summer Finch MD  [facility]    Identified Health Risks:

## 2023-03-06 NOTE — PROGRESS NOTES
"   SUBJECTIVE:   CC: Gloria is an 67 year old who presents for preventive health visit.   {Split Bill scripting  The purpose of this visit is to discuss your medical history and prevent health problems before you are sick. You may be responsible for a co-pay, coinsurance, or deductible if your visit today includes services such as checking on a sore throat, having an x-ray or lab test, or treating and evaluating a new or existing condition :762767}  Patient has been advised of split billing requirements and indicates understanding: Yes  Healthy Habits:   PHQ-2 Total Score: 0    {Add if <65 person on Medicare  - Required Questions (Optional):627245}      {additional problems to add (Optional):596532}    Today's PHQ-2 Score:   PHQ-2 ( 1999 [employer]) 3/6/2023   Q1: Little interest or pleasure in doing things -   Q2: Feeling down, depressed or hopeless -   PHQ-2 Score -   Q1: Little interest or pleasure in doing things -   Q2: Feeling down, depressed or hopeless -   PHQ-2 Score Incomplete           Social History     Tobacco Use     Smoking status: Never     Smokeless tobacco: Never   Substance Use Topics     Alcohol use: Yes     Alcohol/week: 5.0 standard drinks     {Rooming staff  Click this link to complete the Prescreen if response below is not for today's visit  Alcohol Use Prescreen >3 drinks/day or > 7 drinks/week.  If the prescreen question answer is YES, complete the full AUDIT  :196170}    Alcohol Use 12/1/2021   Prescreen: >3 drinks/day or >7 drinks/week? No   {add AUDIT responses (Optional) (A score of 7 for adult men is an indication of hazardous drinking; a score of 8 or more is an indication of an alcohol use disorder.  A score of 7 or more for adult women is an indication of hazardous drinking or an alchohol use disorder):326280}    Reviewed orders with patient.  Reviewed health maintenance and updated orders accordingly - { :405242::\"Yes\"}  {Chronicprobdata (optional):031103}    Breast Cancer " "Screening:  Any new diagnosis of family breast, ovarian, or bowel cancer? No    FHS-7:   Breast CA Risk Assessment (FHS-7) 12/1/2021 9/7/2022   Did any of your first-degree relatives have breast or ovarian cancer? Yes Yes   Did any of your relatives have bilateral breast cancer? Unknown Unknown   Did any man in your family have breast cancer? No No   Did any woman in your family have breast and ovarian cancer? Yes No   Did any woman in your family have breast cancer before age 50 y? No No   Do you have 2 or more relatives with breast and/or ovarian cancer? Yes No   Do you have 2 or more relatives with breast and/or bowel cancer? Yes No     {If any of the questions to the BCRA (FHS-7) are answered yes, consider ordering referral for genetic counseling (Optional) :975018::\"click delete button to remove this line now\"}  {AMB Mammogram Decision Support (Optional) :849483}  Pertinent mammograms are reviewed under the imaging tab.    History of abnormal Pap smear: { :324926}  PAP / HPV Latest Ref Rng & Units 12/1/2021   PAP   Negative for Intraepithelial Lesion or Malignancy (NILM)   HPV16 Negative Negative   HPV18 Negative Negative   HRHPV Negative Negative     Reviewed and updated as needed this visit by clinical staff   Tobacco  Allergies  Meds              Reviewed and updated as needed this visit by Provider                 {HISTORY OPTIONS (Optional):150230}    Review of Systems  {FEMALE ROS (Optional):750955}     OBJECTIVE:   BP (!) 156/89 (BP Location: Right arm, Patient Position: Sitting, Cuff Size: Adult Regular)   Ht 1.549 m (5' 0.98\")   Wt 59.5 kg (131 lb 1.6 oz)   BMI 24.78 kg/m    Physical Exam  {Exam Choices (Optional):977741}    {Diagnostic Test Results (Optional):897998::\"Diagnostic Test Results:\",\"Labs reviewed in Epic\"}    ASSESSMENT/PLAN:   {Diag Picklist:168016}    {Patient advised of split billing (Optional):090912}      COUNSELING:  {FEMALE COUNSELING MESSAGES:649417::\"Reviewed preventive " "health counseling, as reflected in patient instructions\"}        She reports that she has never smoked. She has never used smokeless tobacco.      {Counseling Resources  [place] Preventive Services Task Force  Cholesterol Screening  Health diet/nutrition  Pooled Cohorts Equation Calculator  [employer] MyPlate  ASA Prophylaxis  Lung CA Screening  Osteoporosis prevention/bone health :659497}  {Breast Cancer Risk Calculator  BRCA-Related Cancer Risk Assessment FHS-7 Tool :035111}  Summer Finch MD  [facility]  "

## 2023-03-15 ENCOUNTER — ANCILLARY PROCEDURE (OUTPATIENT)
Dept: BONE DENSITY | Facility: CLINIC | Age: 68
End: 2023-03-15
Attending: FAMILY MEDICINE
Payer: COMMERCIAL

## 2023-03-15 DIAGNOSIS — Z78.0 ASYMPTOMATIC POSTMENOPAUSAL STATUS: ICD-10-CM

## 2023-03-15 PROCEDURE — 77080 DXA BONE DENSITY AXIAL: CPT | Mod: TC | Performed by: RADIOLOGY

## 2023-05-31 ENCOUNTER — MYC REFILL (OUTPATIENT)
Dept: FAMILY MEDICINE | Facility: CLINIC | Age: 68
End: 2023-05-31
Payer: COMMERCIAL

## 2023-05-31 DIAGNOSIS — E06.3 HYPOTHYROIDISM DUE TO HASHIMOTO'S THYROIDITIS: ICD-10-CM

## 2023-06-01 RX ORDER — LEVOTHYROXINE SODIUM 75 UG/1
75 TABLET ORAL DAILY
Qty: 90 TABLET | Refills: 2 | Status: SHIPPED | OUTPATIENT
Start: 2023-06-01 | End: 2024-03-11

## 2023-06-01 NOTE — TELEPHONE ENCOUNTER
"Last Written Prescription Date: 3/6/2023  Last Fill Quantity: 90,  # refills: 3   Last office visit provider: 3/6/2023    Requested Prescriptions   Pending Prescriptions Disp Refills     levothyroxine (SYNTHROID/LEVOTHROID) 75 MCG tablet 90 tablet 0     Sig: Take 1 tablet (75 mcg) by mouth daily       Thyroid Protocol Passed - 6/1/2023  5:00 PM        Passed - Patient is 12 years or older        Passed - Recent (12 mo) or future (30 days) visit within the authorizing provider's specialty     Patient has had an office visit with the authorizing provider or a provider within the authorizing providers department within the previous 12 mos or has a future within next 30 days. See \"Patient Info\" tab in inbasket, or \"Choose Columns\" in Meds & Orders section of the refill encounter.              Passed - Medication is active on med list        Passed - Normal TSH on file in past 12 months     Recent Labs   Lab Test 03/06/23  1548   TSH 0.53              Passed - No active pregnancy on record     If patient is pregnant or has had a positive pregnancy test, please check TSH.          Passed - No positive pregnancy test in past 12 months     If patient is pregnant or has had a positive pregnancy test, please check TSH.               Russel Jasso RN 06/01/23 5:00 PM  "

## 2023-06-02 DIAGNOSIS — E06.3 HYPOTHYROIDISM DUE TO HASHIMOTO'S THYROIDITIS: ICD-10-CM

## 2023-06-02 RX ORDER — LEVOTHYROXINE SODIUM 75 UG/1
TABLET ORAL
Qty: 90 TABLET | Refills: 2 | OUTPATIENT
Start: 2023-06-02

## 2023-06-02 NOTE — TELEPHONE ENCOUNTER
This refill request is a duplicate request, previously received or sent.  Sent denial notification to pharmacy.  SUN SotoN, RN  [facility]

## 2023-06-15 ENCOUNTER — OFFICE VISIT (OUTPATIENT)
Dept: PLASTIC SURGERY | Facility: AMBULATORY SURGERY CENTER | Age: 68
End: 2023-06-15
Payer: COMMERCIAL

## 2023-06-15 VITALS
BODY MASS INDEX: 24.55 KG/M2 | SYSTOLIC BLOOD PRESSURE: 120 MMHG | WEIGHT: 130 LBS | HEIGHT: 61 IN | HEART RATE: 70 BPM | DIASTOLIC BLOOD PRESSURE: 80 MMHG

## 2023-06-15 DIAGNOSIS — Z98.82 HISTORY OF BREAST AUGMENTATION: Primary | ICD-10-CM

## 2023-06-15 PROCEDURE — 99203 OFFICE O/P NEW LOW 30 MIN: CPT | Performed by: PLASTIC SURGERY

## 2023-06-15 NOTE — LETTER
6/15/2023         RE: Gloria Leo  [address]  [place] [facility] [address]        Dear Colleague,    Thank you for referring your patient, Gloria Leo, to the [facility] [place]. Please see a copy of my visit note below.    Chief complaint:  Rule out breast implant capsular contracture    History of present illness:  This is a 67 year old lady who presents with history of cosmetic bilateral augmentation mammoplasty that was performed approximately 32 years ago.  The implants are in the subpectoral plane.  She has been doing well with her breast implants, which has been filled with saline.  However, approximately 1 to 2 years ago, patient felt some hardness on her right breast implant.  She has been referred to me to evaluate her implants.    Bilateral mammogram performed 6-month ago did not disclose any evidence of malignancy.    Past medical history:  No past medical history on file.     Hypothyroidism    Past surgical history:  Bilateral breast augmentation, right knee endoscopic surgery    Allergies:  Diltiazem and Topamax    Medications:    Current Outpatient Medications:      levothyroxine (SYNTHROID/LEVOTHROID) 75 MCG tablet, Take 1 tablet (75 mcg) by mouth daily, Disp: 90 tablet, Rfl: 2     SUMAtriptan (IMITREX) 100 MG tablet, [SUMATRIPTAN (IMITREX) 100 MG TABLET] TAKE 1 TABLET EVERY 2 HOURSAS NEEDED FOR MIGRAINE     (MAXIMUM 2 TABLETS IN 24   HOURS), Disp: 27 tablet, Rfl: 6    Family history:  Noncontributory    Social History:  Denies tobacco, drinks alcohol socially    Review of systems:  General ROS: No complaints or constitutional symptoms  Skin: No complaints or symptoms   Hematologic/Lymphatic: No symptoms or complaints  Psychiatric: No symptoms or complaints  Endocrine: No excessive fatigue, no hypermetabolic symptoms reported  Respiratory ROS: No cough, shortness of breath, or wheezing  Cardiovascular ROS: No chest pain or dyspnea on exertion  Breast ROS: Denies  "palpable breast masses, denies nipple discharge, denies peau d'orange  Gastrointestinal ROS: No abdominal pain, nausea, diarrhea, or constipation  Musculoskeletal ROS: No recent injuries reported  Neurological ROS: No focal neurologic defects reported.      Physical exam:  /80 (BP Location: Right arm, Patient Position: Sitting)   Pulse 70   Ht 1.549 m (5' 1\")   Wt 59 kg (130 lb)   BMI 24.56 kg/m    General: Alert, cooperative, appears stated age   Skin: Skin color, texture, turgor normal, no rashes or lesions   Lymphatic: No obvious adenopathy, no swelling   Eyes: No scleral icterus, pupils equal  HENT: No traumatic injury to the head or face, no gross abnormalities  Lungs: Normal respiratory effort, breath sounds equal bilaterally  Heart: Regular rate and rhythm  Breasts: Bilateral grade 2 ptosis, no nipple inversion, no nipple discharge, no palpable breast masses.  Patient present with Baker 1 capsular contracture (normal appearing).  The implants are in the subpectoral position.  No peau d'orange.  On the right breast, the sternal notch to nipple distance 25 cm, nipple to inframammary fold distance is 6 cm and breast diameter 24 cm.  On the left breast, the sternal notch to nipple distance 28 cm, nipple to inframammary fold distance is 7 cm and breast diameter is 25 cm.  No palpable axillary lymphadenopathies bilaterally.  Abdomen: Soft, non-distended and non-tender to palpation  Neurologic: Grossly intact                                  ASSESSMENT:    This is a 67 year old lady with history of bilateral subpectoral augmentation mammoplasty.    No evidence of breast implant capsular contracture that requires surgical treatment.       PLAN:      No surgical interventions are warranted.    Follow-up with me as needed.    Time spent with the patient 30 minutes.      Melo Mckeon MD, FACS   Diplomate American Board of Plastic Surgery  Diplomate American Board of Surgery  [facility]  Division of Plastic & Reconstructive Surgery  Office: [phone]   6/15/2023 at 10:32 AM        Again, thank you for allowing me to participate in the care of your patient.        Sincerely,        Melo Mckeon MD

## 2023-06-15 NOTE — NURSING NOTE
Patient here today for consult. Patient had saline implants placed approximately 30 years ago and reports intermittent hardness on the right side.     The patient last had a mammogram in September of 2022.    Madalyn Richardson RN on 6/15/2023 at 9:46 AM

## 2023-06-15 NOTE — PROGRESS NOTES
Chief complaint:  Rule out breast implant capsular contracture    History of present illness:  This is a 67 year old lady who presents with history of cosmetic bilateral augmentation mammoplasty that was performed approximately 32 years ago.  The implants are in the subpectoral plane.  She has been doing well with her breast implants, which has been filled with saline.  However, approximately 1 to 2 years ago, patient felt some hardness on her right breast implant.  She has been referred to me to evaluate her implants.    Bilateral mammogram performed 6-month ago did not disclose any evidence of malignancy.    Past medical history:  No past medical history on file.     Hypothyroidism    Past surgical history:  Bilateral breast augmentation, right knee endoscopic surgery    Allergies:  Diltiazem and Topamax    Medications:    Current Outpatient Medications:      levothyroxine (SYNTHROID/LEVOTHROID) 75 MCG tablet, Take 1 tablet (75 mcg) by mouth daily, Disp: 90 tablet, Rfl: 2     SUMAtriptan (IMITREX) 100 MG tablet, [SUMATRIPTAN (IMITREX) 100 MG TABLET] TAKE 1 TABLET EVERY 2 HOURSAS NEEDED FOR MIGRAINE     (MAXIMUM 2 TABLETS IN 24   HOURS), Disp: 27 tablet, Rfl: 6    Family history:  Noncontributory    Social History:  Denies tobacco, drinks alcohol socially    Review of systems:  General ROS: No complaints or constitutional symptoms  Skin: No complaints or symptoms   Hematologic/Lymphatic: No symptoms or complaints  Psychiatric: No symptoms or complaints  Endocrine: No excessive fatigue, no hypermetabolic symptoms reported  Respiratory ROS: No cough, shortness of breath, or wheezing  Cardiovascular ROS: No chest pain or dyspnea on exertion  Breast ROS: Denies palpable breast masses, denies nipple discharge, denies peau d'orange  Gastrointestinal ROS: No abdominal pain, nausea, diarrhea, or constipation  Musculoskeletal ROS: No recent injuries reported  Neurological ROS: No focal neurologic defects reported.   "    Physical exam:  /80 (BP Location: Right arm, Patient Position: Sitting)   Pulse 70   Ht 1.549 m (5' 1\")   Wt 59 kg (130 lb)   BMI 24.56 kg/m    General: Alert, cooperative, appears stated age   Skin: Skin color, texture, turgor normal, no rashes or lesions   Lymphatic: No obvious adenopathy, no swelling   Eyes: No scleral icterus, pupils equal  HENT: No traumatic injury to the head or face, no gross abnormalities  Lungs: Normal respiratory effort, breath sounds equal bilaterally  Heart: Regular rate and rhythm  Breasts: Bilateral grade 2 ptosis, no nipple inversion, no nipple discharge, no palpable breast masses.  Patient present with Baker 1 capsular contracture (normal appearing).  The implants are in the subpectoral position.  No peau d'orange.  On the right breast, the sternal notch to nipple distance 25 cm, nipple to inframammary fold distance is 6 cm and breast diameter 24 cm.  On the left breast, the sternal notch to nipple distance 28 cm, nipple to inframammary fold distance is 7 cm and breast diameter is 25 cm.  No palpable axillary lymphadenopathies bilaterally.  Abdomen: Soft, non-distended and non-tender to palpation  Neurologic: Grossly intact                                  ASSESSMENT:    This is a 67 year old lady with history of bilateral subpectoral augmentation mammoplasty.    No evidence of breast implant capsular contracture that requires surgical treatment.       PLAN:      No surgical interventions are warranted.    Follow-up with me as needed.    Time spent with the patient 30 minutes.      Melo Mckeon MD, FACS   Diplomate American Board of Plastic Surgery  Diplomate American Board of Surgery  [facility]  Division of Plastic & Reconstructive Surgery  Office: [phone]   6/15/2023 at 10:32 AM    "

## 2023-07-19 ENCOUNTER — TRANSFERRED RECORDS (OUTPATIENT)
Dept: HEALTH INFORMATION MANAGEMENT | Facility: CLINIC | Age: 68
End: 2023-07-19
Payer: COMMERCIAL

## 2023-08-08 ENCOUNTER — PATIENT OUTREACH (OUTPATIENT)
Dept: CARE COORDINATION | Facility: CLINIC | Age: 68
End: 2023-08-08
Payer: COMMERCIAL

## 2023-09-05 ENCOUNTER — PATIENT OUTREACH (OUTPATIENT)
Dept: CARE COORDINATION | Facility: CLINIC | Age: 68
End: 2023-09-05
Payer: COMMERCIAL

## 2023-09-13 ENCOUNTER — HOSPITAL ENCOUNTER (OUTPATIENT)
Dept: MAMMOGRAPHY | Facility: CLINIC | Age: 68
Discharge: HOME | End: 2023-09-13
Attending: FAMILY MEDICINE | Admitting: FAMILY MEDICINE
Payer: COMMERCIAL

## 2023-09-13 DIAGNOSIS — Z12.31 VISIT FOR SCREENING MAMMOGRAM: ICD-10-CM

## 2023-09-13 PROCEDURE — 77067 SCR MAMMO BI INCL CAD: CPT

## 2023-11-28 DIAGNOSIS — E06.3 HYPOTHYROIDISM DUE TO HASHIMOTO'S THYROIDITIS: ICD-10-CM

## 2023-11-28 RX ORDER — LEVOTHYROXINE SODIUM 75 UG/1
75 TABLET ORAL DAILY
Qty: 90 TABLET | Refills: 2 | OUTPATIENT
Start: 2023-11-28

## 2024-03-11 DIAGNOSIS — E06.3 HYPOTHYROIDISM DUE TO HASHIMOTO'S THYROIDITIS: ICD-10-CM

## 2024-03-11 RX ORDER — LEVOTHYROXINE SODIUM 75 UG/1
75 TABLET ORAL DAILY
Qty: 90 TABLET | Refills: 0 | Status: SHIPPED | OUTPATIENT
Start: 2024-03-11 | End: 2024-04-24

## 2024-04-11 SDOH — HEALTH STABILITY: PHYSICAL HEALTH: ON AVERAGE, HOW MANY MINUTES DO YOU ENGAGE IN EXERCISE AT THIS LEVEL?: 40 MIN

## 2024-04-11 SDOH — HEALTH STABILITY: PHYSICAL HEALTH: ON AVERAGE, HOW MANY DAYS PER WEEK DO YOU ENGAGE IN MODERATE TO STRENUOUS EXERCISE (LIKE A BRISK WALK)?: 4 DAYS

## 2024-04-11 ASSESSMENT — SOCIAL DETERMINANTS OF HEALTH (SDOH): HOW OFTEN DO YOU GET TOGETHER WITH FRIENDS OR RELATIVES?: ONCE A WEEK

## 2024-04-16 PROBLEM — D12.2 BENIGN NEOPLASM OF ASCENDING COLON: Status: ACTIVE | Noted: 2018-06-19

## 2024-04-18 ENCOUNTER — OFFICE VISIT (OUTPATIENT)
Dept: FAMILY MEDICINE | Facility: CLINIC | Age: 69
End: 2024-04-18
Attending: FAMILY MEDICINE
Payer: COMMERCIAL

## 2024-04-18 VITALS
RESPIRATION RATE: 14 BRPM | SYSTOLIC BLOOD PRESSURE: 150 MMHG | DIASTOLIC BLOOD PRESSURE: 110 MMHG | BODY MASS INDEX: 25.32 KG/M2 | TEMPERATURE: 98.4 F | HEIGHT: 61 IN | WEIGHT: 134.1 LBS | HEART RATE: 72 BPM | OXYGEN SATURATION: 98 %

## 2024-04-18 DIAGNOSIS — E06.3 HYPOTHYROIDISM DUE TO HASHIMOTO'S THYROIDITIS: ICD-10-CM

## 2024-04-18 DIAGNOSIS — G43.909 MIGRAINE WITHOUT STATUS MIGRAINOSUS, NOT INTRACTABLE, UNSPECIFIED MIGRAINE TYPE: ICD-10-CM

## 2024-04-18 DIAGNOSIS — M54.16 LEFT LUMBAR RADICULOPATHY: ICD-10-CM

## 2024-04-18 DIAGNOSIS — M85.89 OSTEOPENIA OF MULTIPLE SITES: ICD-10-CM

## 2024-04-18 DIAGNOSIS — Z00.00 MEDICARE ANNUAL WELLNESS VISIT, SUBSEQUENT: Primary | ICD-10-CM

## 2024-04-18 PROCEDURE — 84439 ASSAY OF FREE THYROXINE: CPT | Performed by: FAMILY MEDICINE

## 2024-04-18 PROCEDURE — 99214 OFFICE O/P EST MOD 30 MIN: CPT | Mod: 25 | Performed by: FAMILY MEDICINE

## 2024-04-18 PROCEDURE — G0438 PPPS, INITIAL VISIT: HCPCS | Performed by: FAMILY MEDICINE

## 2024-04-18 PROCEDURE — 84443 ASSAY THYROID STIM HORMONE: CPT | Performed by: FAMILY MEDICINE

## 2024-04-18 PROCEDURE — 82306 VITAMIN D 25 HYDROXY: CPT | Performed by: FAMILY MEDICINE

## 2024-04-18 PROCEDURE — 80061 LIPID PANEL: CPT | Performed by: FAMILY MEDICINE

## 2024-04-18 PROCEDURE — 36415 COLL VENOUS BLD VENIPUNCTURE: CPT | Performed by: FAMILY MEDICINE

## 2024-04-18 RX ORDER — GABAPENTIN 300 MG/1
CAPSULE ORAL
Qty: 60 CAPSULE | Refills: 1 | Status: SHIPPED | OUTPATIENT
Start: 2024-04-18 | End: 2024-06-04

## 2024-04-18 RX ORDER — RESPIRATORY SYNCYTIAL VIRUS VACCINE 120MCG/0.5
0.5 KIT INTRAMUSCULAR ONCE
Qty: 1 EACH | Refills: 0 | Status: CANCELLED | OUTPATIENT
Start: 2024-04-18 | End: 2024-04-18

## 2024-04-18 RX ORDER — METHYLPREDNISOLONE 4 MG
TABLET, DOSE PACK ORAL
Qty: 21 TABLET | Refills: 0 | Status: SHIPPED | OUTPATIENT
Start: 2024-04-18 | End: 2024-06-04

## 2024-04-18 ASSESSMENT — PAIN SCALES - GENERAL: PAINLEVEL: MILD PAIN (3)

## 2024-04-18 ASSESSMENT — ENCOUNTER SYMPTOMS: BACK PAIN: 1

## 2024-04-18 NOTE — PROGRESS NOTES
Preventive Care Visit  [facility]  Summer Finch MD, Family Medicine  Apr 18, 2024      Assessment & Plan     Medicare annual wellness visit, subsequent  At today's visit, we discussed lifestyle interventions to promote self-management and wellness, including maintenance of a healthy weight, healthy diet, regular physical activity and exercise, and falls prevention.  Seasonal influenza and COVID vaccines declined today.  PCV 20 declined today.  Discussed and offered Shingrix, RSV vaccine, she declines but will consider.  Will check nonfasting lipids today to screen for dyslipidemia.  She is up-to-date with mammogram screening, colon cancer screening, bone density screening.  - Lipid panel reflex to direct LDL Non-fasting; Future  - Lipid panel reflex to direct LDL Non-fasting    Hypothyroidism due to Hashimoto's thyroiditis  Stable and controlled on levothyroxine.  Will check TSH today.  - TSH WITH FREE T4 REFLEX; Future  - TSH WITH FREE T4 REFLEX    Migraine without status migrainosus, not intractable, unspecified migraine type  Rare, sumatriptan as needed.    Osteopenia of multiple sites  Encouraged weightbearing activities, measures to reduce risk of falls, adequate calcium and vitamin D intake.  Will check vitamin D level today.  - Vitamin D Deficiency; Future  - Vitamin D Deficiency    Left lumbar radiculopathy  Reviewed nature of condition.  We discussed red flag symptoms, no current red flag symptoms but to notify us immediately should they occur.  Referral made to physical therapy.  Will work to improve her symptoms quickly with Medrol Dosepak, to resume ibuprofen following completion, and initiation of gabapentin 300 mg at bedtime increasing up to 3 times daily as needed.  - gabapentin (NEURONTIN) 300 MG capsule; Take 1 capsule by mouth at bedtime for nerve pain.  Increase as needed and as tolerated up to 3 times daily.  - methylPREDNISolone (MEDROL DOSEPAK) 4 MG tablet therapy  "pack; Follow Package Directions  - Physical Therapy  Referral; Future              BMI  Estimated body mass index is 25.22 kg/m  as calculated from the following:    Height as of this encounter: 1.553 m (5' 1.14\").    Weight as of this encounter: 60.8 kg (134 lb 1.6 oz).       Counseling  Appropriate preventive services were discussed with this patient, including applicable screening as appropriate for fall prevention, nutrition, physical activity, Tobacco-use cessation, weight loss and cognition.  Checklist reviewing preventive services available has been given to the patient.          Josefina Castro is a 68 year old, presenting for the following:  Physical and Back Pain (L leg sciatic pain)        4/18/2024     1:37 PM   Additional Questions   Roomed by [facility] Care Directive  Patient does not have a Health Care Directive or Living Will: Discussed advance care planning with patient; however, patient declined at this time.    Back Pain       Seen today for routine preventive care visit.  She is been bothered by increasing pain in her low back present for a few months now without any inciting injury, had doing some stretches and avoiding aggravation of it, but over the last few days she is noting increasing pain from left buttock down the back leg to ankle, burning or electrical in character.  Worse with prolonged sitting.  No weakness.  No paresthesias, bowel or bladder changes.  History of osteopenia, last bone density scan just over a year ago.  History of hypothyroidism, compliant with levothyroxine, due for follow-up.  History of migraine headaches, they are very rare and sumatriptan works when needed.            4/11/2024   General Health   How would you rate your overall physical health? Good   Feel stress (tense, anxious, or unable to sleep) Only a little   (!) STRESS CONCERN      4/11/2024   Nutrition   Diet: Regular (no restrictions)         4/11/2024   Exercise   Days per " week of moderate/strenous exercise 4 days   Average minutes spent exercising at this level 40 min         4/11/2024   Social Factors   Frequency of gathering with friends or relatives Once a week   Worry food won't last until get money to buy more No   Food not last or not have enough money for food? No   Do you have housing?  Yes   Are you worried about losing your housing? No   Lack of transportation? No   Unable to get utilities (heat,electricity)? No         4/18/2024   Fall Risk   Fallen 2 or more times in the past year? No   Trouble with walking or balance? No          4/11/2024   Activities of Daily Living- Home Safety   Needs help with the following daily activites None of the above   Safety concerns in the home None of the above         4/11/2024   Dental   Dentist two times every year? Yes         4/11/2024   Hearing Screening   Hearing concerns? (!) I FEEL THAT PEOPLE ARE MUMBLING OR NOT SPEAKING CLEARLY.    None of the above         4/11/2024   Driving Risk Screening   Patient/family members have concerns about driving No         4/11/2024   General Alertness/Fatigue Screening   Have you been more tired than usual lately? (!) YES         4/11/2024   Urinary Incontinence Screening   Bothered by leaking urine in past 6 months No         4/11/2024   TB Screening   Were you born outside of the [place]? No         Today's PHQ-2 Score:       4/18/2024     1:19 PM   PHQ-2 ( 1999 [employer])   Q1: Little interest or pleasure in doing things 0   Q2: Feeling down, depressed or hopeless 0   PHQ-2 Score 0   Q1: Little interest or pleasure in doing things Not at all   Q2: Feeling down, depressed or hopeless Not at all   PHQ-2 Score 0           4/11/2024   Substance Use   Alcohol more than 3/day or more than 7/wk No   Do you have a current opioid prescription? No   How severe/bad is pain from 1 to 10? 6/10   Do you use any other substances recreationally? No     Social History     Tobacco Use    Smoking status: Never     Smokeless tobacco: Never   Substance Use Topics    Alcohol use: Yes     Alcohol/week: 5.0 standard drinks of alcohol    Drug use: No           2023   LAST FHS-7 RESULTS   1st degree relative breast or ovarian cancer No   Any relative bilateral breast cancer Unknown   Any male have breast cancer No   Any ONE woman have BOTH breast AND ovarian cancer No   Any woman with breast cancer before 50yrs No   2 or more relatives with breast AND/OR ovarian cancer Yes   2 or more relatives with breast AND/OR bowel cancer Yes        Mammogram Screening - Mammogram every 1-2 years updated in Health Maintenance based on mutual decision making    ASCVD Risk   The 10-year ASCVD risk score (Radha DICKSON, et al., 2019) is: 8.1%    Values used to calculate the score:      Age: 68 years      Sex: Female      Is Non- : No      Diabetic: No      Tobacco smoker: No      Systolic Blood Pressure: 150 mmHg      Is BP treated: No      HDL Cholesterol: 94 mg/dL      Total Cholesterol: 281 mg/dL            Reviewed and updated as needed this visit by Provider                    No past medical history on file.  Past Surgical History:   Procedure Laterality Date    MAMMOPLASTY AUGMENTATION Bilateral     OTHER SURGICAL HISTORY      2016 knee arthroscopymeniscus tear    ZZC ENLARGE BREAST      Description: Breast Surgery Enlargement Procedure;  Recorded: 2008;     OB History    Para Term  AB Living   2 2 2 0 0 0   SAB IAB Ectopic Multiple Live Births   0 0 0 0 0      # Outcome Date GA Lbr Jose/2nd Weight Sex Type Anes PTL Lv   2 Term            1 Term              Lab work is in process  Labs reviewed in EPIC  BP Readings from Last 3 Encounters:   24 (!) 150/110   06/15/23 120/80   23 (!) 153/91    Wt Readings from Last 3 Encounters:   24 60.8 kg (134 lb 1.6 oz)   06/15/23 59 kg (130 lb)   23 59.5 kg (131 lb 1.6 oz)                  Patient Active Problem List    Diagnosis    Chronic Rhinitis    Migraine Headache    Hypothyroidism due to Hashimoto's thyroiditis    Benign neoplasm of ascending colon     Past Surgical History:   Procedure Laterality Date    MAMMOPLASTY AUGMENTATION Bilateral 1992    OTHER SURGICAL HISTORY      2016 knee arthroscopymeniscus tear    ZZC ENLARGE BREAST      Description: Breast Surgery Enlargement Procedure;  Recorded: 04/07/2008;       Social History     Tobacco Use    Smoking status: Never    Smokeless tobacco: Never   Substance Use Topics    Alcohol use: Yes     Alcohol/week: 5.0 standard drinks of alcohol     Family History   Problem Relation Age of Onset    Breast Cancer Paternal Grandmother 55.00    Breast Cancer Maternal Aunt 55.00    Heart Disease Maternal Grandmother     Breast Cancer Paternal Aunt         had breast removed. not sure if it was cancer    Clotting Disorder No family hx of          Current Outpatient Medications   Medication Sig Dispense Refill    gabapentin (NEURONTIN) 300 MG capsule Take 1 capsule by mouth at bedtime for nerve pain.  Increase as needed and as tolerated up to 3 times daily. 60 capsule 1    levothyroxine (SYNTHROID/LEVOTHROID) 75 MCG tablet TAKE 1 TABLET(75 MCG) BY MOUTH DAILY 90 tablet 0    methylPREDNISolone (MEDROL DOSEPAK) 4 MG tablet therapy pack Follow Package Directions 21 tablet 0    SUMAtriptan (IMITREX) 100 MG tablet [SUMATRIPTAN (IMITREX) 100 MG TABLET] TAKE 1 TABLET EVERY 2 HOURSAS NEEDED FOR MIGRAINE     (MAXIMUM 2 TABLETS IN 24   HOURS) 27 tablet 6     Allergies   Allergen Reactions    Diltiazem Unknown    Topamax [Topiramate] Unknown     Recent Labs   Lab Test 03/06/23  1548 04/26/22  1129 02/15/22  0944 01/18/18  0836 02/29/16  1015   *  --  156*  --  179*   HDL 94  --  73  --  65   TRIG 75  --  101  --  48   TSH 0.53 0.38 0.12*   < >  --     < > = values in this interval not displayed.      Current providers sharing in care for this patient include:  Patient Care Team:  Josette  "MD Summer as PCP - General (Family Practice)  Summer Finch MD as Assigned PCP  Melo Mckeon MD as Assigned Surgical Provider    The following health maintenance items are reviewed in Epic and correct as of today:  Health Maintenance   Topic Date Due    ZOSTER IMMUNIZATION (1 of 2) Never done    RSV VACCINE (Pregnancy & 60+) (1 - 1-dose 60+ series) Never done    Pneumococcal Vaccine: 65+ Years (1 of 1 - PCV) Never done    INFLUENZA VACCINE (1) Never done    COVID-19 Vaccine (3 - 2023-24 season) 09/01/2023    MEDICARE ANNUAL WELLNESS VISIT  03/06/2024    TSH W/FREE T4 REFLEX  03/06/2024    MAMMO SCREENING  09/13/2024    GLUCOSE  02/15/2025    DEXA  03/15/2025    ANNUAL REVIEW OF HM ORDERS  04/18/2025    FALL RISK ASSESSMENT  04/18/2025    LIPID  03/06/2028    ADVANCE CARE PLANNING  03/07/2028    COLORECTAL CANCER SCREENING  07/19/2028    DTAP/TDAP/TD IMMUNIZATION (3 - Td or Tdap) 12/01/2031    PHQ-2 (once per calendar year)  Completed    HEPATITIS C SCREENING  Addressed    IPV IMMUNIZATION  Aged Out    HPV IMMUNIZATION  Aged Out    MENINGITIS IMMUNIZATION  Aged Out    RSV MONOCLONAL ANTIBODY  Aged Out    PAP  Discontinued         Review of Systems  Constitutional, neuro, ENT, endocrine, pulmonary, cardiac, gastrointestinal, genitourinary, musculoskeletal, integument and psychiatric systems are negative, except as otherwise noted.     Objective    Exam  BP (!) 150/110 (BP Location: Left arm, Patient Position: Standing, Cuff Size: Adult Regular)   Pulse 72   Temp 98.4  F (36.9  C) (Temporal)   Resp 14   Ht 1.553 m (5' 1.14\")   Wt 60.8 kg (134 lb 1.6 oz)   SpO2 98%   BMI 25.22 kg/m     Estimated body mass index is 25.22 kg/m  as calculated from the following:    Height as of this encounter: 1.553 m (5' 1.14\").    Weight as of this encounter: 60.8 kg (134 lb 1.6 oz).    Physical Exam  Physical Examination: General appearance - alert, well appearing, and in no distress, oriented to person, place, and time " and normal appearing weight  Mental status - alert, oriented to person, place, and time, normal mood, behavior, speech, dress, motor activity, and thought processes  Eyes - pupils equal and reactive, extraocular eye movements intact  Ears - bilateral TM's and external ear canals normal  Nose - normal and patent, no erythema, discharge or polyps  Mouth - mucous membranes moist, pharynx normal without lesions  Neck - supple, no significant adenopathy  Lymphatics - no palpable lymphadenopathy, no hepatosplenomegaly  Chest - clear to auscultation, no wheezes, rales or rhonchi, symmetric air entry  Heart - normal rate, regular rhythm, normal S1, S2, no murmurs, rubs, clicks or gallops  Abdomen - soft, nontender, nondistended, no masses or organomegaly  Breasts - breasts appear normal, no suspicious masses, no skin or nipple changes or axillary nodes  Neurological - alert, oriented, normal speech, no focal findings or movement disorder noted  Musculoskeletal - no joint tenderness, deformity or swelling; 5 out of 5 strength throughout lower extremities.  Deep tendon reflexes symmetric.  Normal tone.  Straight leg raise positive on left side.  No midline spinal tenderness.  Mild discomfort at SI joint to palpation.  Extremities - peripheral pulses normal, no pedal edema, no clubbing or cyanosis  Skin - normal coloration and turgor, no rashes, no suspicious skin lesions noted           4/18/2024   Mini Cog   Clock Draw Score 2 Normal   3 Item Recall 3 objects recalled   Mini Cog Total Score 5              Signed Electronically by: Summer Finch MD

## 2024-04-20 LAB
CHOLEST SERPL-MCNC: 308 MG/DL
FASTING STATUS PATIENT QL REPORTED: ABNORMAL
HDLC SERPL-MCNC: 83 MG/DL
LDLC SERPL CALC-MCNC: 202 MG/DL
NONHDLC SERPL-MCNC: 225 MG/DL
T4 FREE SERPL-MCNC: 1.17 NG/DL (ref 0.9–1.7)
TRIGL SERPL-MCNC: 117 MG/DL
TSH SERPL DL<=0.005 MIU/L-ACNC: 9.84 UIU/ML (ref 0.3–4.2)
VIT D+METAB SERPL-MCNC: 37 NG/ML (ref 20–50)

## 2024-04-23 ENCOUNTER — NURSE TRIAGE (OUTPATIENT)
Dept: NURSING | Facility: CLINIC | Age: 69
End: 2024-04-23

## 2024-04-23 ENCOUNTER — THERAPY VISIT (OUTPATIENT)
Dept: PHYSICAL THERAPY | Facility: REHABILITATION | Age: 69
End: 2024-04-23
Payer: COMMERCIAL

## 2024-04-23 ENCOUNTER — TELEPHONE (OUTPATIENT)
Dept: FAMILY MEDICINE | Facility: CLINIC | Age: 69
End: 2024-04-23

## 2024-04-23 DIAGNOSIS — E06.3 HYPOTHYROIDISM DUE TO HASHIMOTO'S THYROIDITIS: ICD-10-CM

## 2024-04-23 DIAGNOSIS — M54.16 LEFT LUMBAR RADICULOPATHY: ICD-10-CM

## 2024-04-23 PROCEDURE — 97161 PT EVAL LOW COMPLEX 20 MIN: CPT | Mod: GP

## 2024-04-23 PROCEDURE — 97112 NEUROMUSCULAR REEDUCATION: CPT | Mod: GP

## 2024-04-23 NOTE — TELEPHONE ENCOUNTER
"See lab result note from the PCP to the patient on 4/23/24:    ----- Message from Summer Finch MD sent at 4/23/2024  3:20 PM CDT -----  \"Your thyroid is mildly undertreated.  Have you missed any recent doses of levothyroxine?  Or have you recently started taking another medication or any vitamins or supplements along with it?  And determining whether we need to adjust the dose or recheck it in the future.\"    Writer called the patient and left a message to return call.    When the patient calls back, please  route the patient to the RN queue to gather more information.    Nury Noe, RN, BSN  [facility]    "

## 2024-04-23 NOTE — RESULT ENCOUNTER NOTE
Your thyroid is mildly undertreated.  Have you missed any recent doses of levothyroxine?  Or have you recently started taking another medication or any vitamins or supplements along with it?  And determining whether we need to adjust the dose or recheck it in the future.

## 2024-04-23 NOTE — TELEPHONE ENCOUNTER
"Nurse Triage SBAR    Situation: Calling back     Background: Patient calling. Lab results call noted from today.    Assessment: See lab result note from the PCP to the patient on 4/23/24:     ----- Message from Summer Finch MD sent at 4/23/2024  3:20 PM CDT -----  \"Your thyroid is mildly undertreated.  Have you missed any recent doses of levothyroxine?  Or have you recently started taking another medication or any vitamins or supplements along with it?  And determining whether we need to adjust the dose or recheck it in the future.\"     Writer called the patient and left a message to return call.     When the patient calls back, please  route the patient to the RN queue to gather more information.     Nury Noe RN, BSN  [facility]    Pt states she has not missed any dosages of her Levothyroxine. Pt has been taking collagen for about a year now. She states she was taking something for her metabolism, MCT oil powder by [employer] - taking it for her energy levels and mental health. She states she last had it last Friday and she was taking it for about 1 year and plans to not continue taking it.     Patient states she was prescribed a steroid and gabapentin for her pinched nerve and she stated there has not been any improvement.     Recommendation: Routing back to PCP and care team.     Nelly Bartholomew RN Nursing Advisor 4/23/2024 5:13 PM     Reason for Disposition   Caller requesting lab results  (Exception: Routine or non-urgent lab result.)    Protocols used: PCP Call - No Triage-A-    "

## 2024-04-23 NOTE — PROGRESS NOTES
"PHYSICAL THERAPY EVALUATION  Type of Visit: Evaluation    See electronic medical record for Abuse and Falls Screening details.    Subjective Patient arrives with her partner [employer].  States the pain she currently endorses has been going on for about a week.  States she remembers the LEONARDO: was working out in side-lying performing an UE and LE extension simultaneously when she thinks she \"overdid it.\"  Pt demonstrates her pain starting around L SIJ all the way down to her L ankle.  Sitting is the most aggravating; states within a few minutes it becomes unbearable.  Denies N/T.  States about 20 years ago she had a lower back issue which she received PT for.  Denies bladder/bowel changes.  Denies saddle anesthesia.  Reports she also had a skiing injury some years ago which twisted her L LE, though she notes no issues from this \"for several years now.\"    Characteristics of pain: constant, \"Current running down; low grade electrical\" \"deep cramp\"  Aggravating factors: bending   Relieving factors: walking, movement, laying supine on couch   Functional limitations: sitting, dressing, standing, sleep          Presenting condition or subjective complaint: I have a pinched nerve, the pain runs down my left leg to the bottom of my calf.  My doctor recommend this service.  Date of onset: 04/18/24    Relevant medical history:     Dates & types of surgery: Knee for miniscus tear 2019?    Prior diagnostic imaging/testing results:       Prior therapy history for the same diagnosis, illness or injury: No      Prior Level of Function  Transfers: Independent  Ambulation: Independent  ADL: Independent  IADL: Driving, Housekeeping, Laundry, Work, Yard work    Living Environment  Social support: With a significant other or spouse   Type of home: House   Stairs to enter the home: Yes 2 Is there a railing: No   Ramp: No   Stairs inside the home: Yes 13 Is there a railing: Yes   Help at home: None  Equipment owned:       Employment: Not " Applicable    Hobbies/Interests: Family, working out, home projects (painting,tiling,wood), sewing, decorating, boating, art fairs, yard projects, etc.    Patient goals for therapy: Sit without pain, go back to normal- i was very active without pain running down my leg.    Pain assessment: Location: L glut, L hamstring, L gastroc (muscle belly only)/Rating: constant (3/10) at worst (8/10)     Objective   HIP EVALUATION  INTEGUMENTARY (edema, incisions): intact  POSTURE: Sitting Posture: WNL  GAIT:   Gait Deviations: WNL  ROM:   (Degrees) Left AROM Left PROM  Right AROM Right PROM   Hip Flexion WNL WNL WNL WNL   Hip Extension WNL WNL WNL WNL   Hip Abduction WNL  WNL    Hip Adduction       Hip Internal Rotation WNL WNL WNL WNL   Hip External Rotation  WNL  WNL   Knee Flexion WNL WNL WNL WNL   Knee Extension WNL WNL WNL WNL   Lumbar Side glide WNL WNL   Lumbar Flexion Fingertips to distal knees; limited by pain   Lumbar Extension WNL   DERMATOMES: WNL  MYOTOMES:   - Hip flexion and knee extension: see below  - Ankle dorsiflexion, great toe extension, and ankle plantar flexion: 5/5 bilateral  DTRs:  - Quad: 2 bilateral  - Achilles: right 2, left 0  CORD SIGNS:  - Ankle clonus and Ordonez's: negative bilaterally  PELVIC/SI SCREEN: Compression Test: negative B  STRENGTH:   Pain: - none + mild ++ moderate +++ severe  Strength Scale: 0-5/5 Left Right   Hip Flexion 5- 5-   Hip Abduction 5- 5   Knee Flexion 4 painful 5-   Knee Extension 5 5     LE FLEXIBILITY:   SPECIAL TESTS:    Left Right   TO Negative  Negative    FADIR/Labrum/RUPINDER Negative  Negative    Slump Positive      PALPATION: TTP about L glut med, L hamstring muscle belly, L gastroc muscle belly.      Assessment & Plan   CLINICAL IMPRESSIONS  Medical Diagnosis: M54.16 (ICD-10-CM) - Left lumbar radiculopathy    Treatment Diagnosis: Acute low back pain with radiculopathy.   Impression/Assessment: Patient is a 68 year old female endorsing pain about L  gluts/hamstring/gastroc.  Denies N/T.  The following significant findings have been identified: Pain, Decreased ROM/flexibility, Decreased strength, Impaired muscle performance, and Decreased activity tolerance. These impairments interfere with their ability to perform self care tasks, recreational activities, household chores, driving , and community mobility as compared to previous level of function.  Gloria's sx seem consistent with increased dural tension about L sciatic nerve, affecting the muscle belly of the muscles through which it innervates.  She is appropriate for PT to address the above impairments.    Clinical Decision Making (Complexity):  Clinical Presentation: Stable/Uncomplicated  Clinical Presentation Rationale: based on medical and personal factors listed in PT evaluation  Clinical Decision Making (Complexity): Low complexity    PLAN OF CARE  Treatment Interventions:  Modalities: Dry Needling, E-stim  Interventions: Gait Training, Manual Therapy, Neuromuscular Re-education, Therapeutic Activity, Therapeutic Exercise, Self-Care/Home Management, Standardized Testing    Long Term Goals     PT Goal 1  Goal Identifier: HEP  Goal Description: Patient will report mastery of HEP evidenced by 5/7 days per week adherence with no more than 1-2/10 pain in order to facilitate long term pain relief.  Goal Progress: In progress  Target Date: 05/21/24  PT Goal 2  Goal Identifier: Sleep  Goal Description: Patient will report no more than 1 sleep interuption per night secondary to L LE pain in order to gain restful sleep.  Goal Progress: In progress  Target Date: 06/18/24  PT Goal 3  Goal Identifier: sitting  Goal Description: Patient will report ability to sit for 2+ hours with no more than 1-2/10 pain in order to tolerate long car rides.  Goal Progress: Unable  Target Date: 06/18/24  PT Goal 4  Goal Identifier: ROM  Goal Description: Patient will demonstrate full lumbar flexion AROM with no pain in order to  return to bending activities about the home with ease.  Target Date: 06/18/24  PT Goal 5  Goal Identifier: YADI  Goal Description: Gloria will demonstrate improved function as evidenced by an improved (decreased) YADI score of of less than or equal to 5%.  Goal Progress: 30%  Target Date: 06/18/24      Frequency of Treatment: 1x/week  Duration of Treatment: 8 weeks    Recommended Referrals to Other Professionals: possible referral to spine center pending symptom progression  Education Assessment:   Learner/Method: Patient    Risks and benefits of evaluation/treatment have been explained.   Patient/Family/caregiver agrees with Plan of Care.     Evaluation Time:     PT Eval, Low Complexity Minutes ([id number]): 30     Signing Clinician: Isidro Barba PT      [facility]                                                                                   OUTPATIENT PHYSICAL THERAPY      PLAN OF TREATMENT FOR OUTPATIENT REHABILITATION   Patient's Last Name, First Name, Gloria Glass    YOB: 1955   Provider's Name   [facility]   Medical Record No.  [id number]     Onset Date: 04/18/24  Start of Care Date: 04/23/24     Medical Diagnosis:  M54.16 (ICD-10-CM) - Left lumbar radiculopathy      PT Treatment Diagnosis:  Acute low back pain with radiculopathy. Plan of Treatment  Frequency/Duration: 1x/week/ 8 weeks    Certification date from 04/23/24 to 06/18/24         See note for plan of treatment details and functional goals     Isidro Barba, PT                         I CERTIFY THE NEED FOR THESE SERVICES FURNISHED UNDER        THIS PLAN OF TREATMENT AND WHILE UNDER MY CARE     (Physician attestation of this document indicates review and certification of the therapy plan).              Referring Provider:  Summer Finch    Initial Assessment  See Epic Evaluation- Start of Care Date: 04/23/24

## 2024-04-24 PROBLEM — M54.16 LEFT LUMBAR RADICULOPATHY: Status: ACTIVE | Noted: 2024-04-24

## 2024-04-24 RX ORDER — LEVOTHYROXINE SODIUM 88 UG/1
88 TABLET ORAL DAILY
Qty: 90 TABLET | Refills: 1 | Status: SHIPPED | OUTPATIENT
Start: 2024-04-24 | End: 2024-06-04

## 2024-04-24 NOTE — TELEPHONE ENCOUNTER
"Informed pt of provider's message and pt verbalized understanding. Patient stated that she does not take the Gabapentin during the day and mainly takes Gabapentin at night with 2 capsules (600 mg). She takes Advil during the day. Gabapentin makes her tired but not tired for hours. For example, she takes 2 capsules of her Gabapentin at 10 PM and will be wide away around 2 AM d/t discomfort (leg cramps like pain).      Gabapentin causes some slight drowsy, but she doesn't want to nap during the day, so she is not taking it during the day. Can she bet taking 3 capsules (900 mg) at bedtime to see if that will help with her discomfort at night, so she can get some sleep or sleep longer?    Denied any side effects of Gabapentin, other that initially she felt \"woozy\" the first couple of nights, but that has been gone now. She feels drowsy after taking the Gabapentin, but again she will be wide away in the middle of the night d/t discomfort. She is tired during the day d/t not having good night sleep.    Denied the following side effects:  - Nystagmus  - Dizziness  - Ataxia  - Sedation  - Depression  - Diplopia  - Blurred vision  - Dry mouth  - Tremor  - Weight gain    Will route to PCP to review and advise.    OK to leave detailed messages.    JAQUAN Soto, RN  [facility]      "

## 2024-04-24 NOTE — TELEPHONE ENCOUNTER
Provider Recommendation Follow Up:   Reached patient/caregiver. Informed of provider's recommendations. Patient verbalized understanding and agrees with the plan.     Patient will begin taking 300 mg at bedtime of her gabapentin and call back with any concerns.     Herbert Anna RN  [facility]

## 2024-04-24 NOTE — TELEPHONE ENCOUNTER
I'd like her to increase her levothyroxine dose to 88 mcg daily.  Return for lab only visit in 4 to 6 weeks to recheck thyroid labs.    It looks like she has started physical therapy.  Transition from Medrol Dosepak to ibuprofen once Medrol is completed.  What dose of gabapentin is she taking?  Any side effects?  We can continue to titrate the dose if she is not seeing adequate effect.    VJ

## 2024-05-02 ENCOUNTER — THERAPY VISIT (OUTPATIENT)
Dept: PHYSICAL THERAPY | Facility: REHABILITATION | Age: 69
End: 2024-05-02
Attending: FAMILY MEDICINE
Payer: COMMERCIAL

## 2024-05-02 DIAGNOSIS — M54.16 LEFT LUMBAR RADICULOPATHY: Primary | ICD-10-CM

## 2024-05-02 PROCEDURE — 97110 THERAPEUTIC EXERCISES: CPT | Mod: GP | Performed by: PHYSICAL THERAPIST

## 2024-05-02 PROCEDURE — 97112 NEUROMUSCULAR REEDUCATION: CPT | Mod: GP | Performed by: PHYSICAL THERAPIST

## 2024-05-02 PROCEDURE — 97140 MANUAL THERAPY 1/> REGIONS: CPT | Mod: GP | Performed by: PHYSICAL THERAPIST

## 2024-05-10 ENCOUNTER — THERAPY VISIT (OUTPATIENT)
Dept: PHYSICAL THERAPY | Facility: REHABILITATION | Age: 69
End: 2024-05-10
Payer: COMMERCIAL

## 2024-05-10 DIAGNOSIS — M54.16 LEFT LUMBAR RADICULOPATHY: Primary | ICD-10-CM

## 2024-05-10 PROCEDURE — 97110 THERAPEUTIC EXERCISES: CPT | Mod: GP

## 2024-05-10 PROCEDURE — 97140 MANUAL THERAPY 1/> REGIONS: CPT | Mod: GP

## 2024-05-14 ENCOUNTER — TELEPHONE (OUTPATIENT)
Dept: FAMILY MEDICINE | Facility: CLINIC | Age: 69
End: 2024-05-14
Payer: COMMERCIAL

## 2024-05-14 DIAGNOSIS — M54.16 LEFT LUMBAR RADICULOPATHY: Primary | ICD-10-CM

## 2024-05-14 NOTE — TELEPHONE ENCOUNTER
Called patient to relay message.     I have placed a referral to the spine care center.  Please clarify-- what is patient currently doing for pain in addition to the PT?  Ibuprofen?  Gabapentin (including dose and frequency)?  WE may be able to adjust this in the meantime as well.  Any disruptive muscle tightness or spasm currently?  VJ       Pt stated that she is taking advil up to three times a day and usually 400 mg at a time. She attempted to take tylenol with the advil but that was not doing anything and she feels that the advil alone is better.     Stopped the gabapentin since it didn't really work and took over 2 hours to take effect and didn't last long. Hasn't taken it in about 10 days. Was taking 3 capsules at bedtime. Up all night until 4am until it felt like it was helping and then fall asleep and slept until 10 am and felt groggy and woozy so stopped it.     Pt denied any disruptive muscle tightness or spasms, she said it is more of a deep ache or cramping. Patient rated the pain at 6/10 and can sometimes go higher. Patient stated that the pain is usually at the top of the leg and the bottom of the left butt cheek and can move down to her calf on the left side like it did last week.     Herbert Anna RN  [facility]

## 2024-05-14 NOTE — TELEPHONE ENCOUNTER
Called patient to relay message from Dr. Finch. Patient stated that she would like to just continue to use the advil at this time, does not want to risk any of those side effects listed. Patient advised to call back if any further questions or symptoms worsen or change.     Herbert Anna RN  [facility]

## 2024-05-14 NOTE — TELEPHONE ENCOUNTER
I have placed a referral to the spine care center.  Please clarify-- what is patient currently doing for pain in addition to the PT?  Ibuprofen?  Gabapentin (including dose and frequency)?  WE may be able to adjust this in the meantime as well.  Any disruptive muscle tightness or spasm currently?  VJ

## 2024-05-14 NOTE — TELEPHONE ENCOUNTER
Thank you for the added info.  Could try amitriptyline at bedtime to see if it helps with nerve pain and sleep (can cause grogginess, dry mouth, constipation) or can continue ibuprofen until seen by spine care.  Let me know her preference.  CANDY

## 2024-05-14 NOTE — TELEPHONE ENCOUNTER
Patient Returning Call    Reason for call:  Patient has been to 3 physical therapy appointments and has not noticed any improvement. Patient is wondering what next steps are and if she should see a spine specialist. Please advise. PT recommended the spine center    Information relayed to patient:  message placed, await call backc    Patient has additional questions:  No      Could we send this information to you in [employer] or would you prefer to receive a phone call?:   Patient would prefer a phone call   Okay to leave a detailed message?: Yes at Cell number on file:    Telephone Information:   Mobile [phone]

## 2024-05-16 ENCOUNTER — THERAPY VISIT (OUTPATIENT)
Dept: PHYSICAL THERAPY | Facility: REHABILITATION | Age: 69
End: 2024-05-16
Payer: COMMERCIAL

## 2024-05-16 DIAGNOSIS — M54.16 LEFT LUMBAR RADICULOPATHY: Primary | ICD-10-CM

## 2024-05-16 PROCEDURE — 97140 MANUAL THERAPY 1/> REGIONS: CPT | Mod: GP

## 2024-05-16 PROCEDURE — 97110 THERAPEUTIC EXERCISES: CPT | Mod: GP

## 2024-05-16 PROCEDURE — 97112 NEUROMUSCULAR REEDUCATION: CPT | Mod: GP

## 2024-05-21 ENCOUNTER — THERAPY VISIT (OUTPATIENT)
Dept: PHYSICAL THERAPY | Facility: REHABILITATION | Age: 69
End: 2024-05-21
Payer: COMMERCIAL

## 2024-05-21 DIAGNOSIS — M54.16 LEFT LUMBAR RADICULOPATHY: Primary | ICD-10-CM

## 2024-05-21 PROCEDURE — 97112 NEUROMUSCULAR REEDUCATION: CPT | Mod: GP | Performed by: PHYSICAL THERAPIST

## 2024-05-21 PROCEDURE — 97140 MANUAL THERAPY 1/> REGIONS: CPT | Mod: GP | Performed by: PHYSICAL THERAPIST

## 2024-05-21 PROCEDURE — 97110 THERAPEUTIC EXERCISES: CPT | Mod: GP | Performed by: PHYSICAL THERAPIST

## 2024-05-29 ENCOUNTER — LAB (OUTPATIENT)
Dept: LAB | Facility: CLINIC | Age: 69
End: 2024-05-29
Payer: COMMERCIAL

## 2024-05-29 DIAGNOSIS — E06.3 HYPOTHYROIDISM DUE TO HASHIMOTO'S THYROIDITIS: ICD-10-CM

## 2024-05-29 PROCEDURE — 84439 ASSAY OF FREE THYROXINE: CPT

## 2024-05-29 PROCEDURE — 84443 ASSAY THYROID STIM HORMONE: CPT

## 2024-05-29 PROCEDURE — 36415 COLL VENOUS BLD VENIPUNCTURE: CPT

## 2024-05-30 ENCOUNTER — OFFICE VISIT (OUTPATIENT)
Dept: PHYSICAL MEDICINE AND REHAB | Facility: CLINIC | Age: 69
End: 2024-05-30
Attending: FAMILY MEDICINE
Payer: COMMERCIAL

## 2024-05-30 VITALS
BODY MASS INDEX: 25.39 KG/M2 | DIASTOLIC BLOOD PRESSURE: 89 MMHG | HEART RATE: 84 BPM | WEIGHT: 135 LBS | SYSTOLIC BLOOD PRESSURE: 136 MMHG

## 2024-05-30 DIAGNOSIS — M54.16 LUMBAR RADICULAR PAIN: Primary | ICD-10-CM

## 2024-05-30 DIAGNOSIS — R29.898 LEFT LEG WEAKNESS: ICD-10-CM

## 2024-05-30 DIAGNOSIS — R20.2 PARESTHESIA OF LEFT LEG: ICD-10-CM

## 2024-05-30 DIAGNOSIS — M79.18 MYOFASCIAL PAIN: ICD-10-CM

## 2024-05-30 DIAGNOSIS — M51.26 LUMBAR DISC HERNIATION: ICD-10-CM

## 2024-05-30 LAB
T4 FREE SERPL-MCNC: 1.1 NG/DL (ref 0.9–1.7)
TSH SERPL DL<=0.005 MIU/L-ACNC: 17.6 UIU/ML (ref 0.3–4.2)

## 2024-05-30 PROCEDURE — 99204 OFFICE O/P NEW MOD 45 MIN: CPT | Performed by: PHYSICAL MEDICINE & REHABILITATION

## 2024-05-30 RX ORDER — METHOCARBAMOL 500 MG/1
500 TABLET, FILM COATED ORAL 2 TIMES DAILY PRN
Qty: 30 TABLET | Refills: 1 | Status: SHIPPED | OUTPATIENT
Start: 2024-05-30

## 2024-05-30 ASSESSMENT — PAIN SCALES - GENERAL: PAINLEVEL: MILD PAIN (3)

## 2024-05-30 NOTE — LETTER
5/30/2024         RE: Gloria Leo  [address]  [place] [address]        Dear Colleague,    Thank you for referring your patient, Gloria Leo, to the [facility]. Please see a copy of my visit note below.    Assessment/Plan:      Gloria was seen today for back pain.    Diagnoses and all orders for this visit:    Lumbar radicular pain  -     MR Lumbar Spine w/o Contrast; Future  -     methocarbamol (ROBAXIN) 500 MG tablet; Take 1 tablet (500 mg) by mouth 2 times daily as needed for muscle spasms  -     diclofenac (VOLTAREN) 50 MG EC tablet; Take 1 tablet (50 mg) by mouth 2 times daily as needed for moderate pain    Paresthesia of left leg  -     MR Lumbar Spine w/o Contrast; Future    Left leg weakness  -     MR Lumbar Spine w/o Contrast; Future    Myofascial pain  -     methocarbamol (ROBAXIN) 500 MG tablet; Take 1 tablet (500 mg) by mouth 2 times daily as needed for muscle spasms  -     diclofenac (VOLTAREN) 50 MG EC tablet; Take 1 tablet (50 mg) by mouth 2 times daily as needed for moderate pain    Other orders  -     Spine  Referral         Assessment: Pleasant 68 year old female with a history of hypothyroidism with:    1.  Approximate 3-month history of left gluteal lower extremity radicular pain with paresthesias to the lateral foot, absent left Achilles reflex, and slight weakness ankle plantar flexors all consistent with S1 radiculopathy.  She has had no improvement with Medrol Dosepak trial of gabapentin.  Has been involved in 5-6 visits of physical therapy as well.  No imaging.    2.  Myofascial pain left gluteal region     3. poor sleep related to pain.      Discussion:    1.  I discussed the diagnosis and treatment options.  Discussed options of imaging given the significant pain, medication management, injections, surgical evaluation depending on the diagnosis.  I highly suspect L5-S1 disc herniation with left S1 nerve compression.    2.  Trial  "diclofenac 50 mg twice daily as needed for pain in place of ibuprofen.    3.  Trial methocarbamol 500 mg up to twice a day as needed for myofascial pain.  This can also be helpful for sleep.    4.  MRI lumbar spine evaluate for disc herniation    5.  Based on results likely would recommend left L5-S1 and S1-S2 [facility] will contact over [employer] with further recommendations after imaging completed.  They do have a cruise coming up at the end of July.      It was our pleasure caring for your patient today, if there any questions or concerns please do not hesitate to contact us.      Subjective:   Patient ID: Gloria Leo is a 68 year old female.    History of Present Illness: Patient presents for evaluation of left gluteal lower extremity pain and paresthesias at the request of Dr. Finch.  Patient has had pain for approximately 3 months.  Prior to that was working out and felt a click in the back and that resolved after couple weeks of back pain and then a month later started having pain down the left leg.  This pain is in the left gluteal region over the sciatic notch down the posterior left leg to the calf and lateral foot with constant paresthesias no weakness in the leg.  Also has pain.  Interferes with sleep.  Pain is constant worse with sitting better with standing or changing positions walking seems to be okay but has not taken long walks.  The pain is fairly severe.  Takes Advil without benefit.  Was seen by PCP tried Medrol Dosepak without any improvement gabapentin 300 mg 3 times daily was not helpful and with poor sleep tried 900 mg at bedtime without any benefit as well and grogginess in the morning.  Has been to 5 or 6 visits of PT has slightly improved the pain but still constant and issues 7/10 at worst 3/10 today 2/10 at best.  Takes ibuprofen regularly.    Imaging: None available         Review of Systems: Complains of muscle pain muscle fatigue and \"sciatica\".  Denies fever, weight loss, waking, " headache, change in vision, chest pain, shortness of breath, abdominal pain, nausea vomiting, bowel or bladder incontinence, skin issues, balance issues, depression or anxiety. Remainder of 12 point review systems negative unless listed above.    Past Medical History:   Diagnosis Date     Thyroid disease        Family History   Problem Relation Age of Onset     Breast Cancer Paternal Grandmother 55.00     Breast Cancer Maternal Aunt 55.00     Heart Disease Maternal Grandmother      Breast Cancer Paternal Aunt         had breast removed. not sure if it was cancer     Clotting Disorder No family hx of          Social History     Socioeconomic History     Marital status:      Spouse name: None     Number of children: 3     Years of education: None     Highest education level: None   Tobacco Use     Smoking status: Never     Smokeless tobacco: Never   Substance and Sexual Activity     Alcohol use: Yes     Alcohol/week: 5.0 standard drinks of alcohol     Drug use: No     Social Determinants of Health     Financial Resource Strain: Low Risk  (4/11/2024)    Financial Resource Strain      Within the past 12 months, have you or your family members you live with been unable to get utilities (heat, electricity) when it was really needed?: No   Food Insecurity: Low Risk  (4/11/2024)    Food Insecurity      Within the past 12 months, did you worry that your food would run out before you got money to buy more?: No      Within the past 12 months, did the food you bought just not last and you didn t have money to get more?: No   Transportation Needs: Low Risk  (4/11/2024)    Transportation Needs      Within the past 12 months, has lack of transportation kept you from medical appointments, getting your medicines, non-medical meetings or appointments, work, or from getting things that you need?: No   Physical Activity: Sufficiently Active (4/11/2024)    Exercise Vital Sign      Days of Exercise per Week: 4 days      Minutes of  Exercise per Session: 40 min   Stress: No Stress Concern Present (4/11/2024)    [place] [facility] [employer] - Occupational Stress Questionnaire      Feeling of Stress : Only a little   Social Connections: Unknown (4/11/2024)    Social Connection and Isolation Panel [NHANES]      Frequency of Social Gatherings with Friends and Family: Once a week   Interpersonal Safety: Low Risk  (4/18/2024)    Interpersonal Safety      Do you feel physically and emotionally safe where you currently live?: Yes      Within the past 12 months, have you been hit, slapped, kicked or otherwise physically hurt by someone?: No      Within the past 12 months, have you been humiliated or emotionally abused in other ways by your partner or ex-partner?: No   Housing Stability: Low Risk  (4/11/2024)    Housing Stability      Do you have housing? : Yes      Are you worried about losing your housing?: No     Social history: .  Retired from a bank.  No tobacco does drink alcohol.    The following portions of the patient's history were reviewed and updated as appropriate: allergies, current medications, past family history, past medical history, past social history, past surgical history and problem list.    Oswestry (YADI) Questionnaire        5/29/2024     8:38 AM   OSWESTRY DISABILITY INDEX   Count 10   Sum 18   Oswestry Score (%) 36 %       Neck Disability Index:      5/29/2024     8:41 AM   Neck Disability Index (  Edin H. and Omari RUBIO. 1991. All rights reserved.; used with permission)   SECTION 1 - PAIN INTENSITY 0   SECTION 2 - PERSONAL CARE 0   SECTION 3 - LIFTING 0   SECTION 4 - READING 0   SECTION 5 - HEADACHES 0   SECTION 6 - CONCENTRATION 1   SECTION 7 - WORK 2   SECTION 8 - DRIVING 0   SECTION 9 - SLEEPING 2   SECTION 10 - RECREATION 0   Count 10   Sum 5   Raw Score: /50 5   Neck Disability Index Score: (%) 10 %          PHQ-2 Score:         4/18/2024     1:19 PM 3/6/2023     2:52 PM   PHQ-2 ( 1999 [employer])   Q1:  Little interest or pleasure in doing things 0 0   Q2: Feeling down, depressed or hopeless 0 0   PHQ-2 Score 0 0   Q1: Little interest or pleasure in doing things Not at all Not at all    Not at all   Q2: Feeling down, depressed or hopeless Not at all Not at all    Not at all   PHQ-2 Score 0 0    0                  Objective:   Physical Exam:    /89   Pulse 84   Wt 135 lb (61.2 kg)   BMI 25.39 kg/m    Body mass index is 25.39 kg/m .      General:  Well-appearing female in no acute distress.  Pleasant, cooperative, and interactive throughout the examination and interview.  CV: No lower extremity edema on inspection or paltation.  Lymphatics: No cervical lymphadenopathy palpated. Eyes: sclera clear. Skin: No rashes or lesions seen over the head/neck, hairline, arms, legs, trunk.  Respirations unlabored.  MSK: Gait is nonantalgic.  Able to heel-toe walk without difficulty.  Negative Romberg.  Spine: normal AP curves of the C, T, and L spine.  Dramatically reduced range of motion lumbar spine in all planes.  Palpation: Tenderness to palpation over left sciatic notch.  No tenderness over the lumbar thoracic spinous processes or paraspinal tissues.  Extremities: Full range of motion of the elbows, and wrists with no effusions or tenderness to palpation.   Full range of motion of the hips, knees, and ankles with no effusions or tenderness to palpation.   . No hypermobility of the upper or lower extremities.  Neurologic exam: Mental status: Patient is alert and oriented with normal affect.  Attention, knowledge, memory, and language are intact.  Normal coordination throughout the examination.  Reflexes are 2+ and symmetric biceps, triceps, brachioradialis, patellar, and 1+ right 0 left Achilles with down-going toes and Negative Alvarado's.  Sensation is intact to light touch throughout the upper and lower extremities bilaterally.  Manual muscle testing reveals 5 out of 5 in the hip flexors, knee flexors/extensors,    ankle  dorsiflexors, and EHL, 4+/5 strength left ankle plantar flexors at bedside and with single leg toe raises.  Upper extremities: Grossly normal strength . Normal muscle bulk and tone in the arms and legs.    Positive seated straight leg raise on the left across the distal leg raise.          Again, thank you for allowing me to participate in the care of your patient.        Sincerely,        Armando Mart, DO

## 2024-05-30 NOTE — RESULT ENCOUNTER NOTE
Your TSH level did, suggesting your thyroid is undertreated and in fact has worsened despite the adjustment in levothyroxine.  Have you missed any doses of levothyroxine?  Were you able to increase the dose to 88 mcg daily, and if so, for how long?

## 2024-05-30 NOTE — PROGRESS NOTES
Assessment/Plan:      Gloria was seen today for back pain.    Diagnoses and all orders for this visit:    Lumbar radicular pain  -     MR Lumbar Spine w/o Contrast; Future  -     methocarbamol (ROBAXIN) 500 MG tablet; Take 1 tablet (500 mg) by mouth 2 times daily as needed for muscle spasms  -     diclofenac (VOLTAREN) 50 MG EC tablet; Take 1 tablet (50 mg) by mouth 2 times daily as needed for moderate pain    Paresthesia of left leg  -     MR Lumbar Spine w/o Contrast; Future    Left leg weakness  -     MR Lumbar Spine w/o Contrast; Future    Myofascial pain  -     methocarbamol (ROBAXIN) 500 MG tablet; Take 1 tablet (500 mg) by mouth 2 times daily as needed for muscle spasms  -     diclofenac (VOLTAREN) 50 MG EC tablet; Take 1 tablet (50 mg) by mouth 2 times daily as needed for moderate pain    Other orders  -     Spine  Referral         Assessment: Pleasant 68 year old female with a history of hypothyroidism with:    1.  Approximate 3-month history of left gluteal lower extremity radicular pain with paresthesias to the lateral foot, absent left Achilles reflex, and slight weakness ankle plantar flexors all consistent with S1 radiculopathy.  She has had no improvement with Medrol Dosepak trial of gabapentin.  Has been involved in 5-6 visits of physical therapy as well.  No imaging.    2.  Myofascial pain left gluteal region     3. poor sleep related to pain.      Discussion:    1.  I discussed the diagnosis and treatment options.  Discussed options of imaging given the significant pain, medication management, injections, surgical evaluation depending on the diagnosis.  I highly suspect L5-S1 disc herniation with left S1 nerve compression.    2.  Trial diclofenac 50 mg twice daily as needed for pain in place of ibuprofen.    3.  Trial methocarbamol 500 mg up to twice a day as needed for myofascial pain.  This can also be helpful for sleep.    4.  MRI lumbar spine evaluate for disc herniation    5.  Based  "on results likely would recommend left L5-S1 and S1-S2 [facility] will contact over [facility] with further recommendations after imaging completed.  They do have a cruise coming up at the end of July.      It was our pleasure caring for your patient today, if there any questions or concerns please do not hesitate to contact us.      Subjective:   Patient ID: Gloria Leo is a 68 year old female.    History of Present Illness: Patient presents for evaluation of left gluteal lower extremity pain and paresthesias at the request of Dr. Finch.  Patient has had pain for approximately 3 months.  Prior to that was working out and felt a click in the back and that resolved after couple weeks of back pain and then a month later started having pain down the left leg.  This pain is in the left gluteal region over the sciatic notch down the posterior left leg to the calf and lateral foot with constant paresthesias no weakness in the leg.  Also has pain.  Interferes with sleep.  Pain is constant worse with sitting better with standing or changing positions walking seems to be okay but has not taken long walks.  The pain is fairly severe.  Takes Advil without benefit.  Was seen by PCP tried Medrol Dosepak without any improvement gabapentin 300 mg 3 times daily was not helpful and with poor sleep tried 900 mg at bedtime without any benefit as well and grogginess in the morning.  Has been to 5 or 6 visits of PT has slightly improved the pain but still constant and issues 7/10 at worst 3/10 today 2/10 at best.  Takes ibuprofen regularly.    Imaging: None available         Review of Systems: Complains of muscle pain muscle fatigue and \"sciatica\".  Denies fever, weight loss, waking, headache, change in vision, chest pain, shortness of breath, abdominal pain, nausea vomiting, bowel or bladder incontinence, skin issues, balance issues, depression or anxiety. Remainder of 12 point review systems negative unless listed above.    Past Medical " History:   Diagnosis Date    Thyroid disease        Family History   Problem Relation Age of Onset    Breast Cancer Paternal Grandmother 55.00    Breast Cancer Maternal Aunt 55.00    Heart Disease Maternal Grandmother     Breast Cancer Paternal Aunt         had breast removed. not sure if it was cancer    Clotting Disorder No family hx of          Social History     Socioeconomic History    Marital status:      Spouse name: None    Number of children: 3    Years of education: None    Highest education level: None   Tobacco Use    Smoking status: Never    Smokeless tobacco: Never   Substance and Sexual Activity    Alcohol use: Yes     Alcohol/week: 5.0 standard drinks of alcohol    Drug use: No     Social Determinants of Health     Financial Resource Strain: Low Risk  (4/11/2024)    Financial Resource Strain     Within the past 12 months, have you or your family members you live with been unable to get utilities (heat, electricity) when it was really needed?: No   Food Insecurity: Low Risk  (4/11/2024)    Food Insecurity     Within the past 12 months, did you worry that your food would run out before you got money to buy more?: No     Within the past 12 months, did the food you bought just not last and you didn t have money to get more?: No   Transportation Needs: Low Risk  (4/11/2024)    Transportation Needs     Within the past 12 months, has lack of transportation kept you from medical appointments, getting your medicines, non-medical meetings or appointments, work, or from getting things that you need?: No   Physical Activity: Sufficiently Active (4/11/2024)    Exercise Vital Sign     Days of Exercise per Week: 4 days     Minutes of Exercise per Session: 40 min   Stress: No Stress Concern Present (4/11/2024)    [employer] - Occupational Stress Questionnaire     Feeling of Stress : Only a little   Social Connections: Unknown (4/11/2024)    Social Connection and Isolation Panel  [NHANES]     Frequency of Social Gatherings with Friends and Family: Once a week   Interpersonal Safety: Low Risk  (4/18/2024)    Interpersonal Safety     Do you feel physically and emotionally safe where you currently live?: Yes     Within the past 12 months, have you been hit, slapped, kicked or otherwise physically hurt by someone?: No     Within the past 12 months, have you been humiliated or emotionally abused in other ways by your partner or ex-partner?: No   Housing Stability: Low Risk  (4/11/2024)    Housing Stability     Do you have housing? : Yes     Are you worried about losing your housing?: No     Social history: .  Retired from a bank.  No tobacco does drink alcohol.    The following portions of the patient's history were reviewed and updated as appropriate: allergies, current medications, past family history, past medical history, past social history, past surgical history and problem list.    Oswestry (YADI) Questionnaire        5/29/2024     8:38 AM   OSWESTRY DISABILITY INDEX   Count 10   Sum 18   Oswestry Score (%) 36 %       Neck Disability Index:      5/29/2024     8:41 AM   Neck Disability Index (  Edin H. and Omari RUBIO. 1991. All rights reserved.; used with permission)   SECTION 1 - PAIN INTENSITY 0   SECTION 2 - PERSONAL CARE 0   SECTION 3 - LIFTING 0   SECTION 4 - READING 0   SECTION 5 - HEADACHES 0   SECTION 6 - CONCENTRATION 1   SECTION 7 - WORK 2   SECTION 8 - DRIVING 0   SECTION 9 - SLEEPING 2   SECTION 10 - RECREATION 0   Count 10   Sum 5   Raw Score: /50 5   Neck Disability Index Score: (%) 10 %          PHQ-2 Score:         4/18/2024     1:19 PM 3/6/2023     2:52 PM   PHQ-2 ( 1999 [employer])   Q1: Little interest or pleasure in doing things 0 0   Q2: Feeling down, depressed or hopeless 0 0   PHQ-2 Score 0 0   Q1: Little interest or pleasure in doing things Not at all Not at all    Not at all   Q2: Feeling down, depressed or hopeless Not at all Not at all    Not at all   PHQ-2  Score 0 0    0                  Objective:   Physical Exam:    /89   Pulse 84   Wt 135 lb (61.2 kg)   BMI 25.39 kg/m    Body mass index is 25.39 kg/m .      General:  Well-appearing female in no acute distress.  Pleasant, cooperative, and interactive throughout the examination and interview.  CV: No lower extremity edema on inspection or paltation.  Lymphatics: No cervical lymphadenopathy palpated. Eyes: sclera clear. Skin: No rashes or lesions seen over the head/neck, hairline, arms, legs, trunk.  Respirations unlabored.  MSK: Gait is nonantalgic.  Able to heel-toe walk without difficulty.  Negative Romberg.  Spine: normal AP curves of the C, T, and L spine.  Dramatically reduced range of motion lumbar spine in all planes.  Palpation: Tenderness to palpation over left sciatic notch.  No tenderness over the lumbar thoracic spinous processes or paraspinal tissues.  Extremities: Full range of motion of the elbows, and wrists with no effusions or tenderness to palpation.   Full range of motion of the hips, knees, and ankles with no effusions or tenderness to palpation.   . No hypermobility of the upper or lower extremities.  Neurologic exam: Mental status: Patient is alert and oriented with normal affect.  Attention, knowledge, memory, and language are intact.  Normal coordination throughout the examination.  Reflexes are 2+ and symmetric biceps, triceps, brachioradialis, patellar, and 1+ right 0 left Achilles with down-going toes and Negative Alvarado's.  Sensation is intact to light touch throughout the upper and lower extremities bilaterally.  Manual muscle testing reveals 5 out of 5 in the hip flexors, knee flexors/extensors,   ankle  dorsiflexors, and EHL, 4+/5 strength left ankle plantar flexors at bedside and with single leg toe raises.  Upper extremities: Grossly normal strength . Normal muscle bulk and tone in the arms and legs.    Positive seated straight leg raise on the left across the distal leg  raise.

## 2024-05-30 NOTE — PATIENT INSTRUCTIONS
Diclofenac (which is an anti-inflammatory) medication is prescribed today. Take 1 tablet 2 times a day as needed for pain.This medication should be taken with food and water to prevent any stomach upset. Do not take ibuprofen/Advil/Motrin/Aleve  while you take Diclofenac. Please call if you have any side effects.   An MRI was ordered for you today.  You will be contacted by scheduling within 3 days.    If you are not contacted, please call Radiology at [phone].   methocarbamol (muscle relaxant medication) has been prescribed today. Please take 1 tab up to twice daily as needed. This medication may cause drowsiness. Please do not work or drive while taking this medication until you know how it effects you. If it does make you drowsy, you should only take it before bedtime or at times that you do not have to work/drive.   Please call our nurses if you have any questions: Clinic Phone # [phone]

## 2024-06-03 ENCOUNTER — TELEPHONE (OUTPATIENT)
Dept: FAMILY MEDICINE | Facility: CLINIC | Age: 69
End: 2024-06-03
Payer: COMMERCIAL

## 2024-06-03 DIAGNOSIS — E06.3 HYPOTHYROIDISM DUE TO HASHIMOTO'S THYROIDITIS: ICD-10-CM

## 2024-06-03 NOTE — TELEPHONE ENCOUNTER
Patient Returning Call    Reason for call:  patient returned call     Information relayed to patient:  Writer relayed the patient's lab results and patient states she has not missed a dose of her levothyroxine and has been taking it like instructed. Please advise and call patient if you have any question please and thank you.     Patient has additional questions:  No      Could we send this information to you in [employer] or would you prefer to receive a phone call?:   Patient would prefer a phone call   Okay to leave a detailed message?: Yes at Cell number on file:    Telephone Information:   Mobile [phone]

## 2024-06-04 ENCOUNTER — THERAPY VISIT (OUTPATIENT)
Dept: PHYSICAL THERAPY | Facility: REHABILITATION | Age: 69
End: 2024-06-04
Payer: COMMERCIAL

## 2024-06-04 DIAGNOSIS — M54.16 LEFT LUMBAR RADICULOPATHY: Primary | ICD-10-CM

## 2024-06-04 PROCEDURE — 97140 MANUAL THERAPY 1/> REGIONS: CPT | Mod: GP

## 2024-06-04 PROCEDURE — 97110 THERAPEUTIC EXERCISES: CPT | Mod: GP

## 2024-06-04 PROCEDURE — 97112 NEUROMUSCULAR REEDUCATION: CPT | Mod: GP

## 2024-06-04 RX ORDER — LEVOTHYROXINE SODIUM 100 UG/1
100 TABLET ORAL DAILY
Qty: 90 TABLET | Refills: 1 | Status: SHIPPED | OUTPATIENT
Start: 2024-06-04

## 2024-06-04 NOTE — TELEPHONE ENCOUNTER
Spoke with patient to relay increase in medication dosage with new prescription. She verbalized understanding and had no questions. She will self schedule a lab visit closer to the recheck time.     Marianna Quinonez RN  [facility]

## 2024-06-04 NOTE — PROGRESS NOTES
"    PLAN  Continue therapy per current plan of care.    Beginning/End Dates of Progress Note Reporting Period:  04/23/2024 to 06/04/2024    Referring Provider:  Summer Finch           06/04/24 0500   Appointment Info   Signing clinician's name / credentials Isidro Barba, PT   Visits Used 6   Medical Diagnosis M54.16 (ICD-10-CM) - Left lumbar radiculopathy   PT Tx Diagnosis Acute low back pain with radiculopathy.   Other pertinent information From the eval: Patient arrives with her partner [employer]. States the pain she currently endorses has been going on for about a week. States she remembers the LEONARDO: was working out in side-lying performing an UE and LE extension simultaneously when she thinks she \"overdid it.\" Pt demonstrates her pain starting around L SIJ all the way down to her L ankle. Sitting is the most aggravating; states within a few minutes it becomes unbearable. Denies N/T. States about 20 years ago she had a lower back issue which she received PT for. Denies bladder/bowel changes. Denies saddle anesthesia. Reports she also had a skiing injury some years ago which twisted her L LE.   Quick Adds Certification;Student Supervision   Progress Note/Certification   Start of Care Date 04/23/24   Onset of illness/injury or Date of Surgery 04/18/24   Therapy Frequency 1x/week   Predicted Duration 8 weeks   Certification date from 04/23/24   Certification date to 06/18/24   Progress Note Due Date 06/18/24   Progress Note Completed Date 06/04/24   GOALS   PT Goals 2;3;4   PT Goal 1   Goal Identifier HEP   Goal Description Patient will report mastery of HEP evidenced by 5/7 days per week adherence with no more than 1-2/10 pain in order to facilitate long term pain relief.   Goal Progress 5+ times per week   Target Date 05/21/24   Date Met 06/04/24   PT Goal 2   Goal Identifier Sleep   Goal Description Patient will report no more than 1 sleep interuption per night secondary to L LE pain in order to gain restful sleep. " "  Goal Progress Some nights no issues, can be disturbed up to 3-4 times   Target Date 06/18/24   PT Goal 3   Goal Identifier Sitting   Goal Description Patient will report ability to sit for 2+ hours with no more than 1-2/10 pain in order to tolerate long car rides.   Goal Progress Up to 10 minutes; depends on the chait   Target Date 06/18/24   PT Goal 4   Goal Identifier ROM   Goal Description Patient will demonstrate full lumbar flexion AROM with no pain in order to return to bending activities about the home with ease.   Goal Progress Fingertips to proximal shins (pain-limited)   Target Date 06/18/24   PT Goal 5   Goal Identifier YADI   Goal Description Gloria will demonstrate improved function as evidenced by an improved (decreased) YADI score of of less than or equal to 5%.   Goal Progress 30%   Target Date 06/18/24   Subjective Report   Subjective Report Gloria met with a spine specialist who prescribed a pain medication and a muscle relaxant, but she doesn't feel like they helped much. She now has an MRI scheduled for 6/22/24. She also reports more consistent centralization of symptoms. She said she can now tolerated sleeping on her side a little bit as well. She said that she has been more active recently as well (e.g., painting), and she has tolerated the activities relatively well. She notes that her pain can get to the same \"high\" level, but the duration at that level is much less.   Objective Measures   Objective Measures Objective Measure 1;Objective Measure 2;Objective Measure 3;Objective Measure 4   Objective Measure 1   Objective Measure Worst pain since last visit: 7-8/10   Details Best pain since last visit: 2-3/10   Objective Measure 2   Objective Measure Lumbar Flexion AROM   Details Fingertips to proximal shins (pain-limited)   Objective Measure 3   Objective Measure Lower Extremity Strength   Details Hip flexion: 5-/5 bilateral. Hip abduction and adduction: 5/5 bilateral. Knee flexion: right " 5-/5, left 5/5. Knee extension: right 5/5, left deferred.   Objective Measure 4   Objective Measure Slump   Details Right: negative. Left: positive.   Treatment Interventions (PT)   Interventions Therapeutic Procedure/Exercise;Neuromuscular Re-education;Manual Therapy   Therapeutic Procedure/Exercise   Therapeutic Procedures: strength, endurance, ROM, flexibility minutes (33756) 16   Therapeutic Procedures Ther Proc 2;Ther Proc 3   Ther Proc 1 Treadmill + subjective report + discussion of goals and progress   Ther Proc 1 - Details 8 minutes. See subjective report, goals, and assessment.   Ther Proc 2 Objective measures   Ther Proc 2 - Details See objective measures   Skilled Intervention Exercises to improve core, low back, and lower extremity flexibility/mobility and strength/stability   Patient Response/Progress Gloria tolerated exercises very well today with none to min increase in sx   Neuromuscular Re-education   Neuromuscular re-ed of mvmt, balance, coord, kinesthetic sense, posture, proprioception minutes ([id number]) 17   Neuro Re-ed 1 Seated sciatic nerve glide in hamstring position (sliders)   Neuro Re-ed 1 - Details 1 x 20 right, 1 x 10 on left before seated position started to increased symptoms, so it was stopped   Neuro Re-ed 2 Prone on elbows   Neuro Re-ed 2 - Details 1 minute   Neuro Re-ed 3 Prone positioning   Neuro Re-ed 3 - Details Flat: 5 minutes. On elbows: 2 x 1 minute (one prior to prone push-ups and one followin). Elbows extended: stopped at 40 seconds due to low back discomfort. All performed with one pillow under the hips.   Neuro Re-ed 4 Prone push-ups   Neuro Re-ed 4 - Details To elbows: 1 x 10   Neuro Re-ed 5 Education   Neuro Re-ed 5 - Details Education regarding proper home set up for positioning exercises, specifically discussing use of a pillow under hips and prone positioning.   Skilled Intervention To decrease dural tension and facilitate relief from radicular sx   Patient  "Response/Progress Gloria reported position of comfort/relief with prone laying. Response to exercises as noted above.   Manual Therapy   Manual Therapy: Mobilization, MFR, MLD, friction massage minutes ([id number]) 9   Manual Therapy Manual Therapy 2   Manual Therapy 1 STM   Manual Therapy 1 - Details STM to the L lumbar, glute, hamstring and calf muscle with pt in prone and pillow at the hips   Manual Therapy 2 L LE LA distraction   Manual Therapy 2 - Details L LE LA distraction 5x30-40\" with pt in supine   Skilled Intervention manual techniques to decrease tightness in the L LE in effort to decrease pain and radicular sx   Patient Response/Progress Gloria reported centralization of symptoms during manual distraction, and feeling better overall with MT   Education   Learner/Method Patient   Education Comments Patient and  asked about the purpose of the injection, and therapist gave a brief overview of the goal of the injection to provide relief from pain and inflammation to increase tolerance to activities and accelerate healing process, however therapist advised the patient to direct specific questions to spine specialty provider.  Therapist also gave basic education on the anatomy involved with a herniated disc and subsequent pinched nerve, as this is what they reported the spine specialty provider told them is the likely cause of symptoms.   Plan   Home program See PTRx.   Plan for next session Progress nerve glides as tolerated. Continue manual therapy as beneficial. Possible trial of mechanical traction. Continue extension-based exercises.   Comments   Comments Impression/Assessment: Gloria continues to report gradual improvements from week to week, endorsing notable improvement since initial onset.  However, she and  continue to express that the healing process has been very slow.  She demonstrates improved lumbar flexion active range of motion and increased bilateral lower extremity strength " "on testing today, both of which supports subjective report of overall improvement.  She also continues to report improvements in pain duration with lower pain scores at her \"best.\"  While her sleep tolerance is still highly variable, she does report improved sleep quality since initial onset, noting she is not disturbed at all some nights.  Her sitting tolerance is also improved and she is now able to sit for up to 10 minutes with little to no pain depending on the seat and efficacy of her medications at that time.  YADI will be administered at the next visit to further assess her overall progress up to this point.  She has an MRI scheduled in a few weeks to also further evaluate her progress.  She continues to respond well to manual distraction and extension based exercises and these will be continued as tolerated.  She will continue to benefit from physical therapy to improve her function and tolerance to desired activities.   Total Session Time   Timed Code Treatment Minutes 42   Total Treatment Time (sum of timed and untimed services) 42     "

## 2024-06-04 NOTE — TELEPHONE ENCOUNTER
Thank you for the additional information.  I'd like her increase levothyroxine dose to 100 mcg daily.  I have sent an updated prescription.  Repeat thyroid labs in 4 to 6 weeks.

## 2024-06-11 ENCOUNTER — THERAPY VISIT (OUTPATIENT)
Dept: PHYSICAL THERAPY | Facility: REHABILITATION | Age: 69
End: 2024-06-11
Payer: COMMERCIAL

## 2024-06-11 DIAGNOSIS — M54.16 LEFT LUMBAR RADICULOPATHY: Primary | ICD-10-CM

## 2024-06-11 PROCEDURE — 97112 NEUROMUSCULAR REEDUCATION: CPT | Mod: GP

## 2024-06-11 PROCEDURE — 97110 THERAPEUTIC EXERCISES: CPT | Mod: GP

## 2024-06-18 ENCOUNTER — THERAPY VISIT (OUTPATIENT)
Dept: PHYSICAL THERAPY | Facility: REHABILITATION | Age: 69
End: 2024-06-18
Payer: COMMERCIAL

## 2024-06-18 DIAGNOSIS — M54.16 LEFT LUMBAR RADICULOPATHY: Primary | ICD-10-CM

## 2024-06-18 PROCEDURE — 97140 MANUAL THERAPY 1/> REGIONS: CPT | Mod: GP

## 2024-06-18 PROCEDURE — 97112 NEUROMUSCULAR REEDUCATION: CPT | Mod: GP

## 2024-06-18 PROCEDURE — 97110 THERAPEUTIC EXERCISES: CPT | Mod: GP

## 2024-06-19 NOTE — PROGRESS NOTES
"      [facility]                                                                                   OUTPATIENT PHYSICAL THERAPY    PLAN OF TREATMENT FOR OUTPATIENT REHABILITATION   Patient's Last Name, First Name, Gloria Glass    YOB: 1955   Provider's Name   [facility]   Medical Record No.  [id number]     Onset Date: 04/18/24  Start of Care Date: 04/23/24     Medical Diagnosis:  M54.16 (ICD-10-CM) - Left lumbar radiculopathy      PT Treatment Diagnosis:  Acute low back pain with radiculopathy. Plan of Treatment  Frequency/Duration: 1x/week/ 6 weeks    Certification date from 06/18/24 to 07/30/24         See note for plan of treatment details and functional goals     Isidro Barba, PT                         I CERTIFY THE NEED FOR THESE SERVICES FURNISHED UNDER        THIS PLAN OF TREATMENT AND WHILE UNDER MY CARE     (Physician attestation of this document indicates review and certification of the therapy plan).              Referring Provider:  Summer Finch    Initial Assessment  See Epic Evaluation- Start of Care Date: 04/23/24            PLAN  Continue therapy per current plan of care.    Beginning/End Dates of Progress Note Reporting Period:  06/04/24 to 06/18/2024    Referring Provider:  Summer Finch         06/18/24 0500   Appointment Info   Signing clinician's name / credentials Isidro Barba PT   Visits Used 8   Medical Diagnosis M54.16 (ICD-10-CM) - Left lumbar radiculopathy   PT Tx Diagnosis Acute low back pain with radiculopathy.   Other pertinent information From the eval: Patient arrives with her partner [employer]. States the pain she currently endorses has been going on for about a week. States she remembers the LEONARDO: was working out in side-lying performing an UE and LE extension simultaneously when she thinks she \"overdid it.\" Pt demonstrates her pain starting around L SIJ all the way down to her L ankle. Sitting " is the most aggravating; states within a few minutes it becomes unbearable. Denies N/T. States about 20 years ago she had a lower back issue which she received PT for. Denies bladder/bowel changes. Denies saddle anesthesia. Reports she also had a skiing injury some years ago which twisted her L LE.   Quick Adds Certification;Student Supervision   Progress Note/Certification   Start of Care Date 04/23/24   Onset of illness/injury or Date of Surgery 04/18/24   Therapy Frequency 1x/week   Predicted Duration 6 weeks   Certification date from 06/18/24   Certification date to 07/30/24   Progress Note Due Date 07/16/24   Progress Note Completed Date 06/04/24   GOALS   PT Goals 2;3;4   PT Goal 1   Goal Identifier HEP   Goal Description Patient will report mastery of HEP evidenced by 5/7 days per week adherence with no more than 1-2/10 pain in order to facilitate long term pain relief.   Goal Progress 5+ times per week   Target Date 05/21/24   Date Met 06/04/24   PT Goal 2   Goal Identifier Sleep   Goal Description Patient will report no more than 1 sleep interuption per night secondary to L LE pain in order to gain restful sleep.   Goal Progress 1-2 times/week   Target Date 06/18/24   Date Met 06/18/24   PT Goal 3   Goal Identifier Sitting   Goal Description Patient will report ability to sit for 2+ hours with no more than 1-2/10 pain in order to tolerate long car rides.   Goal Progress Up to 30 minutes   Target Date 06/18/24   PT Goal 4   Goal Identifier ROM   Goal Description Patient will demonstrate full lumbar flexion AROM with no pain in order to return to bending activities about the home with ease.   Goal Progress Fingertips to mid shins (mild discomfort)   Target Date 06/18/24   PT Goal 5   Goal Identifier YADI   Goal Description Gloria will demonstrate improved function as evidenced by an improved (decreased) YADI score of of less than or equal to 5%.   Goal Progress 28%   Target Date 06/18/24   Subjective Report    Subjective Report Gloria reports that she continues to improve with each week better than the last. She specifically notes increased tolerance to sitting   Objective Measures   Objective Measures Objective Measure 1;Objective Measure 2;Objective Measure 3;Objective Measure 4   Objective Measure 1   Objective Measure Worst pain since last visit: 7/10 (short duration)   Details Best pain since last visit: 1/10   Objective Measure 2   Objective Measure Lumbar Flexion AROM   Details Fingertips to mid shins (mild discomfort)   Objective Measure 3   Objective Measure Lower Extremity Strength   Details Hip flexion: 5/5 bilateral. Knee flexion: right 5/5, left 5/5 (mild left hip pain). Knee extension: right 5/5, 4+/5 (painful).   Objective Measure 4   Objective Measure Slump   Details Right: negative. Left: positive.   Treatment Interventions (PT)   Interventions Therapeutic Procedure/Exercise;Neuromuscular Re-education;Manual Therapy   Therapeutic Procedure/Exercise   Therapeutic Procedures: strength, endurance, ROM, flexibility minutes (46269) 14   Therapeutic Procedures Ther Proc 2;Ther Proc 3   Ther Proc 1 Treadmill + subjective report + discussion of continued PT/plan of care/goals   Ther Proc 1 - Details 7 minutes. See subjective report, goals, and assessment.   Ther Proc 2 Objective measures   Ther Proc 2 - Details See objective measures   Skilled Intervention Exercises to improve core, low back, and lower extremity flexibility/mobility and strength/stability   Patient Response/Progress Gloria tolerated warm-up well.   Neuromuscular Re-education   Neuromuscular re-ed of mvmt, balance, coord, kinesthetic sense, posture, proprioception minutes (29242) 15   Neuro Re-ed 2 Quadruped arm extension   Neuro Re-ed 2 - Details Changed to arm extension due to increased symptoms with leg extension. 1 x 10 bilateral (cueing to maintain core brace and to avoid lumbar extension, rotation, or weight shift; tolerated well)  "  Neuro Re-ed 3 Prone positioning   Neuro Re-ed 3 - Details Flat: 3 minutes. Elbows extended: 1 x 1 minute. Both performed without pillow under hips.   Neuro Re-ed 4 Prone push-ups   Neuro Re-ed 4 - Details Full extension: 1 x 15 with bolster under ankles (cueing for exhale overpressure at top)   Neuro Re-ed 5 Pelvic rock   Neuro Re-ed 5 - Details 1 x 20   Skilled Intervention To decrease dural tension and facilitate relief from radicular sx. Core and low back muscle activation/movement coordination training   Patient Response/Progress Added pelvic rocks; changed quadruped leg extension to arm extension with improved tolerance; add exhale overpressure to prone pressups.  Gloria continues to report good tolerance to and symptom relief with extension-based activities and exercises.   Manual Therapy   Manual Therapy: Mobilization, MFR, MLD, friction massage minutes (31661) 9   Manual Therapy Manual Therapy 2   Manual Therapy 2 L LE LA distraction   Manual Therapy 2 - Details L LE LA distraction [id number]\" with pt in prone   Skilled Intervention manual techniques to decrease tightness in the L LE in effort to decrease pain and radicular sx   Patient Response/Progress Gloria again reported symptom relief during and following intervention.   Education   Learner/Method Patient   Education Comments Discussed ongoing physical therapy given symptom improvement/progress, however patient was somewhat noncommittal as to desire to continue or not.   Plan   Home program See PTRx.   Plan for next session Progress nerve glides as tolerated. Continue manual therapy as beneficial. Possible trial of mechanical traction. Continue extension-based exercises.   Comments   Comments Impression/Assessment: Gloria again reports notably improved symptoms over the last week, specifically noting increased tolerance to sitting and walking.  She now reports being able to consistently sit for up to 30 minutes with [id number] pain, and she " also reports being able to walk for up to 15 minutes with minimal discomfort.  She continues to demonstrate improving lumbar active range of motion and lower extremity strength, and her pain scores continue to decrease with patient report of decreasing frequency and duration as well.  She will continue to benefit from physical therapy to improve her function and activity tolerance, and her goals and plan of care have been updated accordingly.   Total Session Time   Timed Code Treatment Minutes 38   Total Treatment Time (sum of timed and untimed services) 38

## 2024-06-22 ENCOUNTER — HOSPITAL ENCOUNTER (OUTPATIENT)
Dept: MRI IMAGING | Facility: HOSPITAL | Age: 69
Discharge: HOME | End: 2024-06-22
Attending: PHYSICAL MEDICINE & REHABILITATION | Admitting: PHYSICAL MEDICINE & REHABILITATION
Payer: COMMERCIAL

## 2024-06-22 DIAGNOSIS — R29.898 LEFT LEG WEAKNESS: ICD-10-CM

## 2024-06-22 DIAGNOSIS — M54.16 LUMBAR RADICULAR PAIN: ICD-10-CM

## 2024-06-22 DIAGNOSIS — R20.2 PARESTHESIA OF LEFT LEG: ICD-10-CM

## 2024-06-22 PROCEDURE — 72148 MRI LUMBAR SPINE W/O DYE: CPT

## 2024-06-24 ENCOUNTER — TELEPHONE (OUTPATIENT)
Dept: PHYSICAL MEDICINE AND REHAB | Facility: CLINIC | Age: 69
End: 2024-06-24
Payer: COMMERCIAL

## 2024-06-24 DIAGNOSIS — M54.16 LUMBAR RADICULOPATHY: Primary | ICD-10-CM

## 2024-06-24 NOTE — TELEPHONE ENCOUNTER
Patient had called in at 912 AM wanting to schedule the recommended injections. Returned her call. Injection explained. Order placed in Epic. Injection requirements reviewed in full with patient. Discussed steroids have immunosuppressant properties which could potentially put patient at a higher risk for a worsened course of any infection including COVID. Stated understanding. Transferred to scheduling to make appointment.

## 2024-06-25 ENCOUNTER — THERAPY VISIT (OUTPATIENT)
Dept: PHYSICAL THERAPY | Facility: REHABILITATION | Age: 69
End: 2024-06-25
Payer: COMMERCIAL

## 2024-06-25 DIAGNOSIS — M54.16 LEFT LUMBAR RADICULOPATHY: Primary | ICD-10-CM

## 2024-06-25 PROCEDURE — 97110 THERAPEUTIC EXERCISES: CPT | Mod: GP

## 2024-06-25 PROCEDURE — 97140 MANUAL THERAPY 1/> REGIONS: CPT | Mod: GP

## 2024-06-25 PROCEDURE — 97112 NEUROMUSCULAR REEDUCATION: CPT | Mod: GP

## 2024-06-27 ENCOUNTER — RADIOLOGY INJECTION OFFICE VISIT (OUTPATIENT)
Dept: PHYSICAL MEDICINE AND REHAB | Facility: CLINIC | Age: 69
End: 2024-06-27
Attending: PHYSICAL MEDICINE & REHABILITATION
Payer: COMMERCIAL

## 2024-06-27 VITALS
TEMPERATURE: 97.9 F | OXYGEN SATURATION: 98 % | RESPIRATION RATE: 16 BRPM | SYSTOLIC BLOOD PRESSURE: 126 MMHG | HEART RATE: 75 BPM | DIASTOLIC BLOOD PRESSURE: 78 MMHG

## 2024-06-27 DIAGNOSIS — M54.16 LUMBAR RADICULAR PAIN: ICD-10-CM

## 2024-06-27 DIAGNOSIS — R20.2 PARESTHESIA OF LEFT LEG: ICD-10-CM

## 2024-06-27 DIAGNOSIS — M51.26 LUMBAR DISC HERNIATION: ICD-10-CM

## 2024-06-27 PROCEDURE — 64483 NJX AA&/STRD TFRM EPI L/S 1: CPT | Mod: LT | Performed by: PHYSICAL MEDICINE & REHABILITATION

## 2024-06-27 PROCEDURE — 64484 NJX AA&/STRD TFRM EPI L/S EA: CPT | Mod: LT | Performed by: PHYSICAL MEDICINE & REHABILITATION

## 2024-06-27 RX ORDER — LIDOCAINE HYDROCHLORIDE 10 MG/ML
INJECTION, SOLUTION EPIDURAL; INFILTRATION; INTRACAUDAL; PERINEURAL
Status: COMPLETED | OUTPATIENT
Start: 2024-06-27 | End: 2024-06-27

## 2024-06-27 RX ORDER — DEXAMETHASONE SODIUM PHOSPHATE 10 MG/ML
INJECTION, SOLUTION INTRAMUSCULAR; INTRAVENOUS
Status: COMPLETED | OUTPATIENT
Start: 2024-06-27 | End: 2024-06-27

## 2024-06-27 RX ADMIN — LIDOCAINE HYDROCHLORIDE 4.5 ML: 10 INJECTION, SOLUTION EPIDURAL; INFILTRATION; INTRACAUDAL; PERINEURAL at 09:11

## 2024-06-27 RX ADMIN — DEXAMETHASONE SODIUM PHOSPHATE 10 MG: 10 INJECTION, SOLUTION INTRAMUSCULAR; INTRAVENOUS at 09:12

## 2024-06-27 RX ADMIN — DEXAMETHASONE SODIUM PHOSPHATE 5 MG: 10 INJECTION, SOLUTION INTRAMUSCULAR; INTRAVENOUS at 09:12

## 2024-06-27 ASSESSMENT — PAIN SCALES - GENERAL
PAINLEVEL: MODERATE PAIN (4)
PAINLEVEL: NO PAIN (1)

## 2024-06-27 NOTE — PATIENT INSTRUCTIONS
Follow-up visit with Dr. Mart in 2-4 weeks to discuss injection outcome and determine care plan going forward.       DISCHARGE INSTRUCTIONS    During office hours (8:00 a.m.- 4:00 p.m.) questions or concerns may be answered  by calling [facility] Navigation Nurses at  [phone].  Messages received after hours will be returned the following business day.      In the case of an emergency, please dial 911 or seek assistance at the nearest Emergency Room/Urgent Care facility.     All Patients:    You may experience an increase in your symptoms for the first 2 days (It may take anywhere between 2 days- 2 weeks for the steroid to have maximum effect).    You may use ice on the injection site, as frequently as 20 minutes each hour if needed.    You may take your pain medicine.    You may continue taking your regular medication after your injection. If you have had a Medial Branch Block you may resume pain medication once your pain diary is completed.    You may shower. No swimming, tub bath or hot tub for 48 hours.  You may remove your bandaid/bandage as soon as you are home.    You may resume light activities, as tolerated.    Resume your usual diet as tolerated.    It is strongly advised that you do not drive for 1-3 hours post injection.    If you have had oral sedation:  Do not drive for 8 hours post injection.      If you have had IV sedation:  Do not drive for 24 hours post injection.  Do not operate hazardous machinery or make important personal/business decisions for 24 hours.      POSSIBLE STEROID SIDE EFFECTS (If steroid/cortisone was used for your procedure)    -If you experience these symptoms, it should only last for a short period    Swelling of the legs              Skin redness (flushing)     Mouth (oral) irritation   Blood sugar (glucose) levels            Sweats                    Mood changes  Headache  Sleeplessness  Weakened immune system for up to 14 days, which could increase the risk of  tom the COVID-19 virus and/or experiencing more severe symptoms of the disease, if exposed.  Decreased effectiveness of the flu vaccine if given within 2 weeks of the steroid.         POSSIBLE PROCEDURE SIDE EFFECTS  -Call the Sp[facility] Center if you are concerned  Increased Pain           Increased numbness/tingling      Nausea/Vomiting          Bruising/bleeding at site      Redness or swelling                                              Difficulty walking      Weakness           Fever greater than 100.5    *In the event of a severe headache after an epidural steroid injection that is relieved by lying down, please call the [facility] to speak with a clinical staff member*

## 2024-07-12 ENCOUNTER — TELEPHONE (OUTPATIENT)
Dept: PHYSICAL THERAPY | Facility: REHABILITATION | Age: 69
End: 2024-07-12
Payer: COMMERCIAL

## 2024-07-12 NOTE — TELEPHONE ENCOUNTER
I called Gloria to ask what her plan was for continued physical therapy, as she had cancelled her last visit without rescheduling.     She had an injection on 6/27/24, and she notes significant improvement from that combined with the progress she had made up to that point. Given her overall improvement, she said she wants to trial a home exercise/management program at this time.    She is going on a cruise and will return in early August. If therapist has not heard back from her by the beginning of August, she will be formally discharged.

## 2024-07-17 ENCOUNTER — LAB (OUTPATIENT)
Dept: LAB | Facility: CLINIC | Age: 69
End: 2024-07-17
Payer: COMMERCIAL

## 2024-07-17 ENCOUNTER — OFFICE VISIT (OUTPATIENT)
Dept: PHYSICAL MEDICINE AND REHAB | Facility: CLINIC | Age: 69
End: 2024-07-17
Payer: COMMERCIAL

## 2024-07-17 VITALS — DIASTOLIC BLOOD PRESSURE: 72 MMHG | HEART RATE: 77 BPM | SYSTOLIC BLOOD PRESSURE: 130 MMHG

## 2024-07-17 DIAGNOSIS — E06.3 HYPOTHYROIDISM DUE TO HASHIMOTO'S THYROIDITIS: ICD-10-CM

## 2024-07-17 DIAGNOSIS — M51.26 LUMBAR DISC HERNIATION: Primary | ICD-10-CM

## 2024-07-17 DIAGNOSIS — M54.16 LUMBAR RADICULAR PAIN: ICD-10-CM

## 2024-07-17 DIAGNOSIS — T75.3XXA MOTION SICKNESS, INITIAL ENCOUNTER: Primary | ICD-10-CM

## 2024-07-17 DIAGNOSIS — R20.2 PARESTHESIA OF LEFT LEG: ICD-10-CM

## 2024-07-17 DIAGNOSIS — R29.898 LEFT LEG WEAKNESS: ICD-10-CM

## 2024-07-17 PROCEDURE — 99213 OFFICE O/P EST LOW 20 MIN: CPT | Performed by: PHYSICAL MEDICINE & REHABILITATION

## 2024-07-17 PROCEDURE — 84443 ASSAY THYROID STIM HORMONE: CPT

## 2024-07-17 PROCEDURE — 36415 COLL VENOUS BLD VENIPUNCTURE: CPT

## 2024-07-17 RX ORDER — SCOLOPAMINE TRANSDERMAL SYSTEM 1 MG/1
1 PATCH, EXTENDED RELEASE TRANSDERMAL
Qty: 4 PATCH | Refills: 1 | Status: SHIPPED | OUTPATIENT
Start: 2024-07-17

## 2024-07-17 ASSESSMENT — PAIN SCALES - GENERAL: PAINLEVEL: MILD PAIN (2)

## 2024-07-17 NOTE — PROGRESS NOTES
Assessment/Plan:      Gloria was seen today for back pain.    Diagnoses and all orders for this visit:    Lumbar disc herniation    Lumbar radicular pain    Left leg weakness    Paresthesia of left leg         Assessment: Pleasant 68 year old female with a history of hypothyroidism with:     1.  Approximately 5 month history of left gluteal lower extremity radicular pain with paresthesias to the lateral foot, absent left Achilles reflex, and slight weakness ankle plantar flexors all consistent with S1 radiculopathy.  She has a left paracentral disc herniation at L5-S1 compressing the left S1 nerve.  She has had no improvement with Medrol Dosepak trial of gabapentin.  Has been involved in physical therapy as well.  Over 80% improvement following a left L5-S1 and S1-S2 TFESI.  Continues to take diclofenac 50 mg daily which works well for her.     2.  Myofascial pain left gluteal region has methocarbamol     3. poor sleep related to pain.  Has methocarbamol.      Discussion:    1.  Overall she is doing quite well following her lumbar epidural and taking diclofenac.  Take diclofenac daily and we discussed the MRI today and long-term treatment options.  She is over 80% improvement and has not had any increase in weakness in the leg but still has some left gluteal pain.  She has a cruise coming up next week to [place].  We discussed options of continuing with diclofenac, providing a Medrol Dosepak for the trip if needed but she is doing fairly well right now and wants to continue with activity modifications and diclofenac.      2.  Continue home exercises.    3.  Continue diclofenac as needed.    4. Continue to monitor symptoms for any signs of weakness.  If she has any progressive weakness she should contact us.    5.  Follow-up as needed.         It was our pleasure caring for your patient today, if there any questions or concerns please do not hesitate to contact us.      Subjective:   Patient ID: Gloria Leo is a  68 year old female.    History of Present Illness: Patient presents with her  today for follow-up of left gluteal and lower extremity radicular pain weakness loss of Achilles reflex.  Overall doing quite well after lumbar epidural steroid injection.  Since last visit had an MRI as well as a left L5-S1 and S1-S2 TFESI.  Pain is dramatically improved at 80% improved since the injection was still taking diclofenac daily.  Pain is a 5/10 at worst 2/10 today 0/10 at best.  She can sit now without significant issues but still has pain with coughing and sneezing.  Laying down is the best for her.  Diclofenac 50 mg daily is helpful.  No new weakness.  They have a cruise coming up and wanted to discuss some activity options as well as the definitive diagnosis.      Imaging: MRI report and images were personally reviewed and discussed with the patient.  A plastic model was utilized during the discussion.  MRI of the lumbar spine personally reviewed.  Left paracentral disc herniation at L5-S1 contacting and compressing the left S1 nerve.  There is also moderate left foraminal stenosis.    Review of Systems: Pertinent positives: Weakness.  Pertinent negatives: No  weakness.  No bowel or bladder incontinence.  No urinary retention.  No fevers, unintentional weight loss, balance changes, headaches, frequent falling, difficulty swallowing, or coordination difficulties.  All others reviewed are negative.           Current Outpatient Medications   Medication Sig Dispense Refill    diclofenac (VOLTAREN) 50 MG EC tablet Take 1 tablet (50 mg) by mouth 2 times daily as needed for moderate pain 60 tablet 1    levothyroxine (SYNTHROID/LEVOTHROID) 100 MCG tablet Take 1 tablet (100 mcg) by mouth daily 90 tablet 1    methocarbamol (ROBAXIN) 500 MG tablet Take 1 tablet (500 mg) by mouth 2 times daily as needed for muscle spasms 30 tablet 1    SUMAtriptan (IMITREX) 100 MG tablet [SUMATRIPTAN (IMITREX) 100 MG TABLET] TAKE 1 TABLET EVERY  2 HOURSAS NEEDED FOR MIGRAINE     (MAXIMUM 2 TABLETS IN 24   HOURS) 27 tablet 6     No current facility-administered medications for this visit.       Past Medical History:   Diagnosis Date    Thyroid disease        The following portions of the patient's history were reviewed and updated as appropriate: allergies, current medications, past family history, past medical history, past social history, past surgical history and problem list.           Objective:   Physical Exam:    /72   Pulse 77   There is no height or weight on file to calculate BMI.      General: Alert and oriented with normal affect. Attention, knowledge, memory, and language are intact. No acute distress.   Eyes: Sclerae are clear.  Respirations: Unlabored. CV: No lower extremity edema.     Gait:  Nonantalgic    Sensation is intact to light touch throughout the   lower extremities.  Reflexes ar  2+ patellar and 2+ right 0 left Achilles      Manual muscle testing reveals:  Right /Left out of 5     5/5 hip flexors  5/5 knee flexors  5/5 knee extensors  5/5 ankle plantar flexors  5/5 ankle dorsiflexors  5/5  EHL

## 2024-07-17 NOTE — LETTER
7/17/2024      Gloria Leo  [address]  [place] [facility] [address]      Dear Colleague,    Thank you for referring your patient, Gloria Leo, to the [facility]. Please see a copy of my visit note below.    Assessment/Plan:      Gloria was seen today for back pain.    Diagnoses and all orders for this visit:    Lumbar disc herniation    Lumbar radicular pain    Left leg weakness    Paresthesia of left leg         Assessment: Pleasant 68 year old female with a history of hypothyroidism with:     1.  Approximately 5 month history of left gluteal lower extremity radicular pain with paresthesias to the lateral foot, absent left Achilles reflex, and slight weakness ankle plantar flexors all consistent with S1 radiculopathy.  She has a left paracentral disc herniation at L5-S1 compressing the left S1 nerve.  She has had no improvement with Medrol Dosepak trial of gabapentin.  Has been involved in physical therapy as well.  Over 80% improvement following a left L5-S1 and S1-S2 TFESI.  Continues to take diclofenac 50 mg daily which works well for her.     2.  Myofascial pain left gluteal region has methocarbamol     3. poor sleep related to pain.  Has methocarbamol.      Discussion:    1.  Overall she is doing quite well following her lumbar epidural and taking diclofenac.  Take diclofenac daily and we discussed the MRI today and long-term treatment options.  She is over 80% improvement and has not had any increase in weakness in the leg but still has some left gluteal pain.  She has a cruise coming up next week to [place].  We discussed options of continuing with diclofenac, providing a Medrol Dosepak for the trip if needed but she is doing fairly well right now and wants to continue with activity modifications and diclofenac.      2.  Continue home exercises.    3.  Continue diclofenac as needed.    4. Continue to monitor symptoms for any signs of weakness.  If she has any progressive  weakness she should contact us.    5.  Follow-up as needed.         It was our pleasure caring for your patient today, if there any questions or concerns please do not hesitate to contact us.      Subjective:   Patient ID: Gloria Leo is a 68 year old female.    History of Present Illness: Patient presents with her  today for follow-up of left gluteal and lower extremity radicular pain weakness loss of Achilles reflex.  Overall doing quite well after lumbar epidural steroid injection.  Since last visit had an MRI as well as a left L5-S1 and S1-S2 TFESI.  Pain is dramatically improved at 80% improved since the injection was still taking diclofenac daily.  Pain is a 5/10 at worst 2/10 today 0/10 at best.  She can sit now without significant issues but still has pain with coughing and sneezing.  Laying down is the best for her.  Diclofenac 50 mg daily is helpful.  No new weakness.  They have a cruise coming up and wanted to discuss some activity options as well as the definitive diagnosis.      Imaging: MRI report and images were personally reviewed and discussed with the patient.  A plastic model was utilized during the discussion.  MRI of the lumbar spine personally reviewed.  Left paracentral disc herniation at L5-S1 contacting and compressing the left S1 nerve.  There is also moderate left foraminal stenosis.    Review of Systems: Pertinent positives: Weakness.  Pertinent negatives: No  weakness.  No bowel or bladder incontinence.  No urinary retention.  No fevers, unintentional weight loss, balance changes, headaches, frequent falling, difficulty swallowing, or coordination difficulties.  All others reviewed are negative.           Current Outpatient Medications   Medication Sig Dispense Refill     diclofenac (VOLTAREN) 50 MG EC tablet Take 1 tablet (50 mg) by mouth 2 times daily as needed for moderate pain 60 tablet 1     levothyroxine (SYNTHROID/LEVOTHROID) 100 MCG tablet Take 1 tablet (100 mcg) by  mouth daily 90 tablet 1     methocarbamol (ROBAXIN) 500 MG tablet Take 1 tablet (500 mg) by mouth 2 times daily as needed for muscle spasms 30 tablet 1     SUMAtriptan (IMITREX) 100 MG tablet [SUMATRIPTAN (IMITREX) 100 MG TABLET] TAKE 1 TABLET EVERY 2 HOURSAS NEEDED FOR MIGRAINE     (MAXIMUM 2 TABLETS IN 24   HOURS) 27 tablet 6     No current facility-administered medications for this visit.       Past Medical History:   Diagnosis Date     Thyroid disease        The following portions of the patient's history were reviewed and updated as appropriate: allergies, current medications, past family history, past medical history, past social history, past surgical history and problem list.           Objective:   Physical Exam:    /72   Pulse 77   There is no height or weight on file to calculate BMI.      General: Alert and oriented with normal affect. Attention, knowledge, memory, and language are intact. No acute distress.   Eyes: Sclerae are clear.  Respirations: Unlabored. CV: No lower extremity edema.     Gait:  Nonantalgic    Sensation is intact to light touch throughout the   lower extremities.  Reflexes ar  2+ patellar and 2+ right 0 left Achilles      Manual muscle testing reveals:  Right /Left out of 5     5/5 hip flexors  5/5 knee flexors  5/5 knee extensors  5/5 ankle plantar flexors  5/5 ankle dorsiflexors  5/5  EHL       Again, thank you for allowing me to participate in the care of your patient.        Sincerely,        Armando Mart DO

## 2024-07-18 LAB — TSH SERPL DL<=0.005 MIU/L-ACNC: 3.5 UIU/ML (ref 0.3–4.2)

## 2024-07-24 DIAGNOSIS — M79.18 MYOFASCIAL PAIN: ICD-10-CM

## 2024-07-24 DIAGNOSIS — M54.16 LUMBAR RADICULAR PAIN: ICD-10-CM

## 2024-08-08 ENCOUNTER — TRANSFERRED RECORDS (OUTPATIENT)
Dept: MULTI SPECIALTY CLINIC | Facility: CLINIC | Age: 69
End: 2024-08-08

## 2024-08-14 PROBLEM — M54.16 LEFT LUMBAR RADICULOPATHY: Status: RESOLVED | Noted: 2024-04-24 | Resolved: 2024-08-14

## 2024-08-14 NOTE — PROGRESS NOTES
"    DISCHARGE  Reason for Discharge: Patient has met or approached all of her goals and now chooses to discharge to a home exercise/maintenance program.    Equipment Issued: N/A    Discharge Plan: Patient to continue home program.    Referring Provider:  Smumer Finch           06/25/24 0500   Appointment Info   Signing clinician's name / credentials Isidro Barba, PT   Visits Used 9   Medical Diagnosis M54.16 (ICD-10-CM) - Left lumbar radiculopathy   PT Tx Diagnosis Acute low back pain with radiculopathy.   Other pertinent information From the eval: Patient arrives with her partner [employer]. States the pain she currently endorses has been going on for about a week. States she remembers the LEONARDO: was working out in side-lying performing an UE and LE extension simultaneously when she thinks she \"overdid it.\" Pt demonstrates her pain starting around L SIJ all the way down to her L ankle. Sitting is the most aggravating; states within a few minutes it becomes unbearable. Denies N/T. States about 20 years ago she had a lower back issue which she received PT for. Denies bladder/bowel changes. Denies saddle anesthesia. Reports she also had a skiing injury some years ago which twisted her L LE.   Quick Adds Certification;Student Supervision   Progress Note/Certification   Start of Care Date 04/23/24   Onset of illness/injury or Date of Surgery 04/18/24   Therapy Frequency 1x/week   Predicted Duration 6 weeks   Certification date from 06/18/24   Certification date to 07/30/24   Progress Note Due Date 07/16/24   Progress Note Completed Date 06/18/24   GOALS   PT Goals 2;3;4   PT Goal 1   Goal Identifier HEP   Goal Description Patient will report mastery of HEP evidenced by 5/7 days per week adherence with no more than 1-2/10 pain in order to facilitate long term pain relief.   Goal Progress 5+ times per week   Target Date 05/21/24   Date Met 06/04/24   PT Goal 2   Goal Identifier Sleep   Goal Description Patient will report no " more than 1 sleep interuption per night secondary to L LE pain in order to gain restful sleep.   Goal Progress 1-2 times/week   Target Date 06/18/24   Date Met 06/18/24   PT Goal 3   Goal Identifier Sitting   Goal Description Patient will report ability to sit for 2+ hours with no more than 1-2/10 pain in order to tolerate long car rides.   Goal Progress Up to 30 minutes   Target Date 06/18/24   PT Goal 4   Goal Identifier ROM   Goal Description Patient will demonstrate full lumbar flexion AROM with no pain in order to return to bending activities about the home with ease.   Goal Progress Fingertips to mid shins (mild discomfort)   Target Date 06/18/24   PT Goal 5   Goal Identifier YADI   Goal Description Gloria will demonstrate improved function as evidenced by an improved (decreased) YADI score of of less than or equal to 5%.   Goal Progress 28%   Target Date 06/18/24   Subjective Report   Subjective Report Gloria had an MRI which showed a left S1 nerve root impingement, and she now has a TFESI scheduled for 7/9/24. She continues to report gradual improvements with increasing sitting tolerance. She feels a little more sore today, though she is unsure why.   Objective Measures   Objective Measures Objective Measure 1;Objective Measure 2;Objective Measure 3;Objective Measure 4   Objective Measure 1   Objective Measure Worst pain since last visit: 5/10 (short duration)   Details Best pain since last visit: 1/10   Objective Measure 2   Objective Measure Lumbar Flexion AROM   Details 6/18/24: Fingertips to mid shins (mild discomfort)   Objective Measure 3   Objective Measure Lower Extremity Strength   Details 6/18/24: Hip flexion: 5/5 bilateral. Knee flexion: right 5/5, left 5/5 (mild left hip pain). Knee extension: right 5/5, 4+/5 (painful).   Objective Measure 4   Objective Measure Slump   Details 6/18/24: Right: negative. Left: positive.   Treatment Interventions (PT)   Interventions Therapeutic  Procedure/Exercise;Neuromuscular Re-education;Manual Therapy   Therapeutic Procedure/Exercise   Therapeutic Procedures: strength, endurance, ROM, flexibility minutes (09598) 15   Therapeutic Procedures Ther Proc 2;Ther Proc 3   Ther Proc 1 Treadmill + subjective report   Ther Proc 1 - Details 7 minutes. See subjective report, goals, and assessment.   Ther Proc 2 Bridge/with abduction isometric/with adduction isometric   Ther Proc 2 - Details 1 x 10 with 3-5 second holds each (green band with abduction isometric)   Skilled Intervention Exercises to improve core, low back, and lower extremity flexibility/mobility and strength/stability   Patient Response/Progress Returned to bridge and added abduction and adduction isometric, though abduction/adduction variations will be held as patient reports slightly increased symptoms during performance. Gloria tolerated exercises and progressions well without increased symptoms and with cueing for proper form.   Neuromuscular Re-education   Neuromuscular re-ed of mvmt, balance, coord, kinesthetic sense, posture, proprioception minutes (57106) 13   Neuro Re-ed 1 TA set+   Neuro Re-ed 1 - Details On exercise ball: 1 x 15 marching bilateral (arms crossed; cueing to maintain core brace). 1 x 30 seconds bilateral single-leg balance (arms crossed; cueing to maintain core brace).   Neuro Re-ed 3 Prone positioning   Neuro Re-ed 3 - Details Flat: 3 minutes. Elbows extended: 1 x 1 minute. Both performed without pillow under hips.   Neuro Re-ed 5 Pelvic rock   Neuro Re-ed 5 - Details On exercise ball: 1 x 15 each forward/back, side-to-side, and clockwise/counterclockwise   Skilled Intervention To decrease dural tension and facilitate relief from radicular sx. Core and low back muscle activation/movement coordination training   Patient Response/Progress Added exercise ball pelvic mobility and core stability training.  Gloria continues to report good tolerance to and symptom relief with  "extension-based activities and exercises.   Manual Therapy   Manual Therapy: Mobilization, MFR, MLD, friction massage minutes (49233) 10   Manual Therapy Manual Therapy 2   Manual Therapy 2 L LE LA distraction   Manual Therapy 2 - Details L LE LA distraction [id number]\" with pt in prone   Skilled Intervention manual techniques to decrease tightness in the L LE in effort to decrease pain and radicular sx   Patient Response/Progress Gloria reported sypmtom improvement following intervention compared to throughout the morning.   Education   Learner/Method Patient   Education Comments Discussed ongoing physical therapy given symptom improvement/progress, however patient was somewhat noncommittal as to desire to continue or not.   Plan   Home program See PTRx.   Plan for next session Progress nerve glides as tolerated. Continue manual therapy as beneficial. Possible trial of mechanical traction. Continue extension-based exercises.   Comments   Comments Impression/Assessment: Gloria continues to report a steady upward trend, though this morning she is a bit more sore than usual, and she is unsure why. She got her MRI results which showed S1 nerve impingement, and she has an injection scheduled for 7/9/24. Therapy today focused on introducing core stability exercises in sitting given her increased sitting tolerance, and she tolerated the progressions well. She also continues to find great relief with manual therapy, reporting improved symptoms following intervention compared to this morning prior to PT session, which she had reported as her highest pain since last appointment. She remains motivated to participate and will continue to benefit from physical therapy as she also pursues a TFESI.   Total Session Time   Timed Code Treatment Minutes 38   Total Treatment Time (sum of timed and untimed services) 38     "

## 2024-08-30 DIAGNOSIS — E06.3 HYPOTHYROIDISM DUE TO HASHIMOTO'S THYROIDITIS: ICD-10-CM

## 2024-08-30 RX ORDER — LEVOTHYROXINE SODIUM 100 UG/1
100 TABLET ORAL DAILY
Qty: 90 TABLET | Refills: 1 | OUTPATIENT
Start: 2024-08-30

## 2024-12-14 ENCOUNTER — HEALTH MAINTENANCE LETTER (OUTPATIENT)
Age: 69
End: 2024-12-14

## 2025-02-20 DIAGNOSIS — E06.3 HYPOTHYROIDISM DUE TO HASHIMOTO'S THYROIDITIS: ICD-10-CM

## 2025-02-20 RX ORDER — LEVOTHYROXINE SODIUM 100 UG/1
100 TABLET ORAL DAILY
Qty: 90 TABLET | Refills: 0 | Status: SHIPPED | OUTPATIENT
Start: 2025-02-20

## 2025-02-22 DIAGNOSIS — E06.3 HYPOTHYROIDISM DUE TO HASHIMOTO'S THYROIDITIS: ICD-10-CM

## 2025-02-24 RX ORDER — LEVOTHYROXINE SODIUM 100 UG/1
100 TABLET ORAL DAILY
Qty: 90 TABLET | Refills: 0 | OUTPATIENT
Start: 2025-02-24

## 2025-05-14 ENCOUNTER — TELEPHONE (OUTPATIENT)
Dept: FAMILY MEDICINE | Facility: CLINIC | Age: 70
End: 2025-05-14
Payer: COMMERCIAL

## 2025-05-14 ENCOUNTER — DOCUMENTATION ONLY (OUTPATIENT)
Dept: FAMILY MEDICINE | Facility: CLINIC | Age: 70
End: 2025-05-14
Payer: COMMERCIAL

## 2025-05-14 NOTE — TELEPHONE ENCOUNTER
Order/Referral Request    Who is requesting: Patient     Orders being requested: thyroid labs (and any other due labs)    Reason service is needed/diagnosis: recheck for meds    When are orders needed by: as soon as possible     Has this been discussed with Provider: Yes    Does patient have a preference on a Group/Provider/Facility? [facility]    Does patient have an appointment scheduled?: Yes:     Where to send orders: Place orders within Epic    Could we send this information to you in [employer] or would you prefer to receive a phone call?:   Patient would prefer a phone call   Okay to leave a detailed message?: Yes at Cell number on file:    Telephone Information:   Mobile [phone]

## 2025-05-14 NOTE — PROGRESS NOTES
Gloria Ahmet has an upcoming lab appointment:    Future Appointments   Date Time Provider Department Center   5/20/2025 10:45 AM [facility]     Patient is scheduled for the following lab(s): thyroid labs.    There is no order available. Please review and place either future orders or HMPO (Review of Health Maintenance Protocol Orders), as appropriate.    Health Maintenance Due   Topic    LIPID     ANNUAL REVIEW OF HM ORDERS      Nory Horn

## 2025-05-14 NOTE — TELEPHONE ENCOUNTER
Patient has lab apt on 5-20-25 . Patient requesting thyroid labs and anything else you see she need's. Last office visit 4-18-24 Last thyroid labs 7-17-24

## 2025-05-15 NOTE — TELEPHONE ENCOUNTER
Lab only visit not appropriate as last clinic visit was >1 year ago.  She should schedule an AWV at which time we will review labs that are due.  CANDY

## 2025-05-16 DIAGNOSIS — E06.3 HYPOTHYROIDISM DUE TO HASHIMOTO'S THYROIDITIS: ICD-10-CM

## 2025-05-16 DIAGNOSIS — G43.909 MIGRAINE WITHOUT STATUS MIGRAINOSUS, NOT INTRACTABLE, UNSPECIFIED MIGRAINE TYPE: ICD-10-CM

## 2025-05-16 NOTE — TELEPHONE ENCOUNTER
Reason for Call:  Appointment Request    Patient requesting this type of appt:  Preventive     Requested provider: Summer Finch    Reason patient unable to be scheduled: Not within requested timeframe    When does patient want to be seen/preferred time: Tuesdays or Wednesdays work best. 05/20/2025 pt has an eye appt in the afternoon. But mornings work.     Comments: Pt states if PCP is okay to refill meds for 1 month supply, she can keep with a Jada annual physical appt (first available with ).    Could we send this information to you in [employer] or would you prefer to receive a phone call?:   Patient would like to be contacted via [employer]    Call taken on 5/16/2025 at 3:03 PM by Daren Marcial

## 2025-05-18 ENCOUNTER — HEALTH MAINTENANCE LETTER (OUTPATIENT)
Age: 70
End: 2025-05-18

## 2025-05-20 RX ORDER — LEVOTHYROXINE SODIUM 100 UG/1
100 TABLET ORAL DAILY
Qty: 90 TABLET | Refills: 0 | Status: SHIPPED | OUTPATIENT
Start: 2025-05-20

## 2025-05-20 RX ORDER — SUMATRIPTAN SUCCINATE 100 MG/1
100 TABLET ORAL
Qty: 27 TABLET | Refills: 0 | Status: SHIPPED | OUTPATIENT
Start: 2025-05-20

## 2025-05-20 NOTE — TELEPHONE ENCOUNTER
Asking to refill medication, has apt in June will run out before then. Other wise requesting a sooner appointment

## 2025-06-13 SDOH — HEALTH STABILITY: PHYSICAL HEALTH: ON AVERAGE, HOW MANY MINUTES DO YOU ENGAGE IN EXERCISE AT THIS LEVEL?: 40 MIN

## 2025-06-13 SDOH — HEALTH STABILITY: PHYSICAL HEALTH: ON AVERAGE, HOW MANY DAYS PER WEEK DO YOU ENGAGE IN MODERATE TO STRENUOUS EXERCISE (LIKE A BRISK WALK)?: 4 DAYS

## 2025-06-13 ASSESSMENT — SOCIAL DETERMINANTS OF HEALTH (SDOH): HOW OFTEN DO YOU GET TOGETHER WITH FRIENDS OR RELATIVES?: ONCE A WEEK

## 2025-07-02 ENCOUNTER — OFFICE VISIT (OUTPATIENT)
Dept: FAMILY MEDICINE | Facility: CLINIC | Age: 70
End: 2025-07-02
Payer: COMMERCIAL

## 2025-07-02 VITALS
HEART RATE: 73 BPM | SYSTOLIC BLOOD PRESSURE: 130 MMHG | RESPIRATION RATE: 16 BRPM | HEIGHT: 62 IN | DIASTOLIC BLOOD PRESSURE: 82 MMHG | BODY MASS INDEX: 24.84 KG/M2 | TEMPERATURE: 98.3 F | WEIGHT: 135 LBS | OXYGEN SATURATION: 98 %

## 2025-07-02 DIAGNOSIS — G43.909 MIGRAINE WITHOUT STATUS MIGRAINOSUS, NOT INTRACTABLE, UNSPECIFIED MIGRAINE TYPE: ICD-10-CM

## 2025-07-02 DIAGNOSIS — M25.562 ACUTE PAIN OF BOTH KNEES: ICD-10-CM

## 2025-07-02 DIAGNOSIS — E06.3 HYPOTHYROIDISM DUE TO HASHIMOTO'S THYROIDITIS: ICD-10-CM

## 2025-07-02 DIAGNOSIS — M25.561 ACUTE PAIN OF BOTH KNEES: ICD-10-CM

## 2025-07-02 DIAGNOSIS — Z78.0 ASYMPTOMATIC POSTMENOPAUSAL STATUS: ICD-10-CM

## 2025-07-02 DIAGNOSIS — Z00.00 MEDICARE ANNUAL WELLNESS VISIT, SUBSEQUENT: Primary | ICD-10-CM

## 2025-07-02 DIAGNOSIS — M85.80 OSTEOPENIA, UNSPECIFIED LOCATION: ICD-10-CM

## 2025-07-02 DIAGNOSIS — Z12.31 ENCOUNTER FOR SCREENING MAMMOGRAM FOR MALIGNANT NEOPLASM OF BREAST: ICD-10-CM

## 2025-07-02 LAB
CHOLEST SERPL-MCNC: 273 MG/DL
FASTING STATUS PATIENT QL REPORTED: ABNORMAL
FASTING STATUS PATIENT QL REPORTED: NORMAL
GLUCOSE SERPL-MCNC: 87 MG/DL (ref 70–99)
HDLC SERPL-MCNC: 89 MG/DL
LDLC SERPL CALC-MCNC: 173 MG/DL
NONHDLC SERPL-MCNC: 184 MG/DL
TRIGL SERPL-MCNC: 54 MG/DL
TSH SERPL DL<=0.005 MIU/L-ACNC: 0.34 UIU/ML (ref 0.3–4.2)
VIT D+METAB SERPL-MCNC: 43 NG/ML (ref 20–50)

## 2025-07-02 PROCEDURE — 82306 VITAMIN D 25 HYDROXY: CPT | Performed by: FAMILY MEDICINE

## 2025-07-02 PROCEDURE — G2211 COMPLEX E/M VISIT ADD ON: HCPCS | Performed by: FAMILY MEDICINE

## 2025-07-02 PROCEDURE — 84443 ASSAY THYROID STIM HORMONE: CPT | Performed by: FAMILY MEDICINE

## 2025-07-02 PROCEDURE — 36415 COLL VENOUS BLD VENIPUNCTURE: CPT | Performed by: FAMILY MEDICINE

## 2025-07-02 PROCEDURE — G0439 PPPS, SUBSEQ VISIT: HCPCS | Performed by: FAMILY MEDICINE

## 2025-07-02 PROCEDURE — 80061 LIPID PANEL: CPT | Performed by: FAMILY MEDICINE

## 2025-07-02 PROCEDURE — 3079F DIAST BP 80-89 MM HG: CPT | Performed by: FAMILY MEDICINE

## 2025-07-02 PROCEDURE — 82947 ASSAY GLUCOSE BLOOD QUANT: CPT | Performed by: FAMILY MEDICINE

## 2025-07-02 PROCEDURE — 99213 OFFICE O/P EST LOW 20 MIN: CPT | Mod: 25 | Performed by: FAMILY MEDICINE

## 2025-07-02 PROCEDURE — 1126F AMNT PAIN NOTED NONE PRSNT: CPT | Performed by: FAMILY MEDICINE

## 2025-07-02 PROCEDURE — 3075F SYST BP GE 130 - 139MM HG: CPT | Performed by: FAMILY MEDICINE

## 2025-07-02 RX ORDER — LEVOTHYROXINE SODIUM 100 UG/1
100 TABLET ORAL DAILY
Qty: 90 TABLET | Refills: 0 | Status: SHIPPED | OUTPATIENT
Start: 2025-07-02

## 2025-07-02 RX ORDER — SUMATRIPTAN SUCCINATE 100 MG/1
100 TABLET ORAL
Qty: 27 TABLET | Refills: 0 | Status: SHIPPED | OUTPATIENT
Start: 2025-07-02

## 2025-07-02 SDOH — HEALTH STABILITY: PHYSICAL HEALTH: ON AVERAGE, HOW MANY DAYS PER WEEK DO YOU ENGAGE IN MODERATE TO STRENUOUS EXERCISE (LIKE A BRISK WALK)?: 4 DAYS

## 2025-07-02 SDOH — HEALTH STABILITY: PHYSICAL HEALTH: ON AVERAGE, HOW MANY MINUTES DO YOU ENGAGE IN EXERCISE AT THIS LEVEL?: 40 MIN

## 2025-07-02 ASSESSMENT — SOCIAL DETERMINANTS OF HEALTH (SDOH): HOW OFTEN DO YOU GET TOGETHER WITH FRIENDS OR RELATIVES?: ONCE A WEEK

## 2025-07-02 ASSESSMENT — PAIN SCALES - GENERAL: PAINLEVEL_OUTOF10: NO PAIN (0)

## 2025-07-02 NOTE — PATIENT INSTRUCTIONS
Patient Education   Preventive Care Advice   This is general advice given by our system to help you stay healthy. However, your care team may have specific advice just for you. Please talk to your care team about your preventive care needs.  Nutrition  Eat 5 or more servings of fruits and vegetables each day.  Try wheat bread, brown rice and whole grain pasta (instead of white bread, rice, and pasta).  Get enough calcium and vitamin D. Check the label on foods and aim for 100% of the RDA (recommended daily allowance).  Lifestyle  Exercise at least 150 minutes each week  (30 minutes a day, 5 days a week).  Do muscle strengthening activities 2 days a week. These help control your weight and prevent disease.  No smoking.  Wear sunscreen to prevent skin cancer.  Have a dental exam and cleaning every 6 months.  Yearly exams  See your health care team every year to talk about:  Any changes in your health.  Any medicines your care team has prescribed.  Preventive care, family planning, and ways to prevent chronic diseases.  Shots (vaccines)   HPV shots (up to age 26), if you've never had them before.  Hepatitis B shots (up to age 59), if you've never had them before.  COVID-19 shot: Get this shot when it's due.  Flu shot: Get a flu shot every year.  Tetanus shot: Get a tetanus shot every 10 years.  Pneumococcal, hepatitis A, and RSV shots: Ask your care team if you need these based on your risk.  Shingles shot (for age 50 and up)  General health tests  Diabetes screening:  Starting at age 35, Get screened for diabetes at least every 3 years.  If you are younger than age 35, ask your care team if you should be screened for diabetes.  Cholesterol test: At age 39, start having a cholesterol test every 5 years, or more often if advised.  Bone density scan (DEXA): At age 50, ask your care team if you should have this scan for osteoporosis (brittle bones).  Hepatitis C: Get tested at least once in your life.  STIs (sexually  transmitted infections)  Before age 24: Ask your care team if you should be screened for STIs.  After age 24: Get screened for STIs if you're at risk. You are at risk for STIs (including HIV) if:  You are sexually active with more than one person.  You don't use condoms every time.  You or a partner was diagnosed with a sexually transmitted infection.  If you are at risk for HIV, ask about PrEP medicine to prevent HIV.  Get tested for HIV at least once in your life, whether you are at risk for HIV or not.  Cancer screening tests  Cervical cancer screening: If you have a cervix, begin getting regular cervical cancer screening tests starting at age 21.  Breast cancer scan (mammogram): If you've ever had breasts, begin having regular mammograms starting at age 40. This is a scan to check for breast cancer.  Colon cancer screening: It is important to start screening for colon cancer at age 45.  Have a colonoscopy test every 10 years (or more often if you're at risk) Or, ask your provider about stool tests like a FIT test every year or Cologuard test every 3 years.  To learn more about your testing options, visit:   .  For help making a decision, visit:   https://bit.ly/hf29553.  Prostate cancer screening test: If you have a prostate, ask your care team if a prostate cancer screening test (PSA) at age 55 is right for you.  Lung cancer screening: If you are a current or former smoker ages 50 to 80, ask your care team if ongoing lung cancer screenings are right for you.  For informational purposes only. Not to replace the advice of your health care provider. Copyright   2023 [facility] [employer]. All rights reserved. Clinically reviewed by the [facility]. [employer] 719008 - REV 01/24.  Hearing Loss: Care Instructions  Overview     Hearing loss is a sudden or slow decrease in how well you hear. It can range from slight to profound. Permanent hearing loss can occur with aging. It also can  happen when you are exposed long-term to loud noise. Examples include listening to loud music, riding motorcycles, or being around other loud machines.  Hearing loss can affect your work and home life. It can make you feel lonely or depressed. You may feel that you have lost your independence. But hearing aids and other devices can help you hear better and feel connected to others.  Follow-up care is a key part of your treatment and safety. Be sure to make and go to all appointments, and call your doctor if you are having problems. It's also a good idea to know your test results and keep a list of the medicines you take.  How can you care for yourself at home?  Avoid loud noises whenever possible. This helps keep your hearing from getting worse.  Always wear hearing protection around loud noises.  Wear a hearing aid as directed.  A professional can help you pick a hearing aid that will work best for you.  You can also get hearing aids over the counter for mild to moderate hearing loss.  Have hearing tests as your doctor suggests. They can show whether your hearing has changed. Your hearing aid may need to be adjusted.  Use other devices as needed. These may include:  Telephone amplifiers and hearing aids that can connect to a television, stereo, radio, or microphone.  Devices that use lights or vibrations. These alert you to the doorbell, a ringing telephone, or a baby monitor.  Television closed-captioning. This shows the words at the bottom of the screen. Most new TVs can do this.  TTY (text telephone). This lets you type messages back and forth on the telephone instead of talking or listening. These devices are also called TDD. When messages are typed on the keyboard, they are sent over the phone line to a receiving TTY. The message is shown on a monitor.  Use text messaging, social media, and email if it is hard for you to communicate by telephone.  Try to learn a listening technique called speechreading. It is  "not lipreading. You pay attention to people's gestures, expressions, posture, and tone of voice. These clues can help you understand what a person is saying. Face the person you are talking to, and have them face you. Make sure the lighting is good. You need to see the other person's face clearly.  Think about counseling if you need help to adjust to your hearing loss.  When should you call for help?  Watch closely for changes in your health, and be sure to contact your doctor if:    You think your hearing is getting worse.     You have new symptoms, such as dizziness or nausea.   Where can you learn more?  Go to https://www.[employer].net/patiented  Enter R798 in the search box to learn more about \"Hearing Loss: Care Instructions.\"  Current as of: October 27, 2024  Content Version: 14.5    0499-2990 [employer].   Care instructions adapted under license by your healthcare professional. If you have questions about a medical condition or this instruction, always ask your healthcare professional. [employer] disclaims any warranty or liability for your use of this information.       "

## 2025-07-02 NOTE — PROGRESS NOTES
Preventive Care Visit  [facility]  Summer Finch MD, Family Medicine  Jul 2, 2025      Assessment & Plan     Medicare annual wellness visit, subsequent  At today's visit, we discussed lifestyle interventions to promote self-management and wellness, including maintenance of a healthy weight, healthy diet, regular physical activity and exercise, and falls prevention.  Discussed and encouraged COVID-vaccine, PCV 20, Shingrix.  She declines.  Advised early testing for COVID if any symptoms occur if she would qualify for Paxlovid.  Order placed for screening mammogram and screening bone density scan, she is up-to-date with colon cancer screening.  We will screen for dyslipidemia and diabetes today.  - Lipid panel reflex to direct LDL Fasting; Future  - Glucose; Future  - Lipid panel reflex to direct LDL Fasting  - Glucose    Encounter for screening mammogram for malignant neoplasm of breast  - MA Screen Bilateral w/[facility]; Future    Asymptomatic postmenopausal status  - DX Bone Density; Future    Hypothyroidism due to Hashimoto's thyroiditis  Clinically euthyroid.  Continue levothyroxine.  We will check TSH cascade today.  - levothyroxine (SYNTHROID/LEVOTHROID) 100 MCG tablet; Take 1 tablet (100 mcg) by mouth daily.  - TSH with free T4 reflex; Future  - TSH with free T4 reflex    Migraine without status migrainosus, not intractable, unspecified migraine type  Rare, controlled with sumatriptan as needed.  Continue.  - SUMAtriptan (IMITREX) 100 MG tablet; Take 1 tablet (100 mg) by mouth at onset of headache for migraine.    Acute pain of both knees  No specific findings on examination today, improving overall.  Suspect osteoarthritis with mild flare.  May use over-the-counter analgesics, ibuprofen, topical Voltaren.  If persisting symptoms, advised that she schedule visit with orthopedics.    Osteopenia  Low risk for fracture on most recent bone density scan.  Will check vitamin D level today.  Order  "placed for follow-up 1 density scan.      The longitudinal plan of care for the diagnosis(es)/condition(s) as documented were addressed during this visit. Due to the added complexity in care, I will continue to support Gloria in the subsequent management and with ongoing continuity of care.      BMI  Estimated body mass index is 25.09 kg/m  as calculated from the following:    Height as of this encounter: 1.562 m (5' 1.5\").    Weight as of this encounter: 61.2 kg (135 lb).       Counseling  Appropriate preventive services were addressed with this patient via screening, questionnaire, or discussion as appropriate for fall prevention, nutrition, physical activity, Tobacco-use cessation, social engagement, weight loss and cognition.  Checklist reviewing preventive services available has been given to the patient.  Reviewed patient's diet, addressing concerns and/or questions.   The patient was provided with written information regarding signs of hearing loss.             Subjective   Gloria is a 69 year old, presenting for the following:  Wellness Visit (fasting) and Knee pain (New knee pain the past couple weeks)         Via the Health Maintenance questionnaire, the patient has reported the following services have been completed -Mammogram: [facility] 2024-08-08, this information has been sent to the abstraction team.    HPI  History of Present Illness-  Knee Pain  - Knees started bothering approximately three weeks ago.  - Left knee began aching first, followed by the right knee a week later.  - Pain occurs later in the day, not upon waking.  - No swelling, locking, clicking, or getting stuck in place.  - No known injury, fever, chills, night sweats, tick bites, or new rashes.  - Pain was significant during a car show a couple of weeks ago.  - Has been taking Advil occasionally, usually one at night, but not daily.  - No use of ice, heat, splint, or brace.  - Engages in activities like bending and squatting due to " painting tasks at home.  - Pain has lessened in the last couple of days, possibly due to reduced physical activity.    Hearing Issues  - Experiences a constant high-pitched humming noise in the head, possibly more on the right side.  - Difficulty hearing certain tones, such as slow and quiet speech.  - No significant interference with social interactions reported.         Advance Care Planning    Discussed advance care planning with patient; informed AVS has link to [facility].        7/2/2025   General Health   How would you rate your overall physical health? Good   Feel stress (tense, anxious, or unable to sleep) Only a little   (!) STRESS CONCERN      7/2/2025   Nutrition   Diet: Regular (no restrictions)         7/2/2025   Exercise   Days per week of moderate/strenous exercise 4 days   Average minutes spent exercising at this level 40 min         7/2/2025   Social Factors   Frequency of gathering with friends or relatives Once a week   Worry food won't last until get money to buy more No   Food not last or not have enough money for food? No   Do you have housing? (Housing is defined as stable permanent housing and does not include staying outside in a car, in a tent, in an abandoned building, in an overnight shelter, or couch-surfing.) Yes   Are you worried about losing your housing? No   Lack of transportation? No   Unable to get utilities (heat,electricity)? No         7/2/2025   Fall Risk   Fallen 2 or more times in the past year? No    Trouble with walking or balance? No        Proxy-reported          7/2/2025   Activities of Daily Living- Home Safety   Needs help with the following daily activites None of the above   Safety concerns in the home None of the above         7/2/2025   Dental   Dentist two times every year? Yes         7/2/2025   Hearing Screening   Hearing concerns? (!) I FEEL THAT PEOPLE ARE MUMBLING OR NOT SPEAKING CLEARLY.    (!) TROUBLE FOLLOWING DIALOGUE IN THE THEATHER.   Would you  like a referral for hearing testing? No       Multiple values from one day are sorted in reverse-chronological order         7/2/2025   Driving Risk Screening   Patient/family members have concerns about driving No         7/2/2025   General Alertness/Fatigue Screening   Have you been more tired than usual lately? No         7/2/2025   Urinary Incontinence Screening   Bothered by leaking urine in past 6 months No         Today's PHQ-2 Score:       7/2/2025    10:46 AM   PHQ-2 ( 1999 [employer])   Q1: Little interest or pleasure in doing things 0   Q2: Feeling down, depressed or hopeless 0   PHQ-2 Score 0    Q1: Little interest or pleasure in doing things Not at all   Q2: Feeling down, depressed or hopeless Not at all   PHQ-2 Score 0       Patient-reported           7/2/2025   Substance Use   Alcohol more than 3/day or more than 7/wk No   Do you have a current opioid prescription? No   How severe/bad is pain from 1 to 10? 0/10 (No Pain)   Do you use any other substances recreationally? No     Social History     Tobacco Use    Smoking status: Never    Smokeless tobacco: Never   Vaping Use    Vaping status: Never Used   Substance Use Topics    Alcohol use: Yes     Alcohol/week: 5.0 standard drinks of alcohol    Drug use: No           9/13/2023   LAST FHS-7 RESULTS   1st degree relative breast or ovarian cancer No   Any relative bilateral breast cancer Unknown   Any male have breast cancer No   Any ONE woman have BOTH breast AND ovarian cancer No   Any woman with breast cancer before 50yrs No   2 or more relatives with breast AND/OR ovarian cancer Yes   2 or more relatives with breast AND/OR bowel cancer Yes        Mammogram Screening - Mammogram every 1-2 years updated in Health Maintenance based on mutual decision making      History of abnormal Pap smear: No - age 65 or older with adequate negative prior screening test results (3 consecutive negative cytology results, 2 consecutive negative cotesting results, or 2  consecutive negative HrHPV test results within 10 years, with the most recent test occurring within the recommended screening interval for the test used)        Latest Ref Rng & Units 2021     1:11 PM   PAP / HPV   PAP  Negative for Intraepithelial Lesion or Malignancy (NILM)    HPV 16 DNA Negative Negative    HPV 18 DNA Negative Negative    Other HR HPV Negative Negative      ASCVD Risk   The 10-year ASCVD risk score (Radha DICKSON, et al., 2019) is: 7.5%    Values used to calculate the score:      Age: 69 years      Sex: Female      Is Non- : No      Diabetic: No      Tobacco smoker: No      Systolic Blood Pressure: 130 mmHg      Is BP treated: No      HDL Cholesterol: 83 mg/dL      Total Cholesterol: 308 mg/dL            Reviewed and updated as needed this visit by Provider                    Past Medical History:   Diagnosis Date    Arthritis 2022    Thyroid disease      Past Surgical History:   Procedure Laterality Date    MAMMOPLASTY AUGMENTATION Bilateral 1992    OTHER SURGICAL HISTORY       knee arthroscopymeniscus tear    ZZC ENLARGE BREAST      Description: Breast Surgery Enlargement Procedure;  Recorded: 2008;     OB History    Para Term  AB Living   2 2 2 0 0 0   SAB IAB Ectopic Multiple Live Births   0 0 0 0 0      # Outcome Date GA Lbr Jose/2nd Weight Sex Type Anes PTL Lv   2 Term            1 Term              Lab work is in process  Labs reviewed in EPIC  BP Readings from Last 3 Encounters:   25 130/82   24 130/72   24 126/78    Wt Readings from Last 3 Encounters:   25 61.2 kg (135 lb)   24 61.2 kg (135 lb)   24 60.8 kg (134 lb 1.6 oz)                  Patient Active Problem List   Diagnosis    Chronic Rhinitis    Migraine Headache    Hypothyroidism due to Hashimoto's thyroiditis    Benign neoplasm of ascending colon    Osteopenia, unspecified location     Past Surgical History:   Procedure Laterality  Date    MAMMOPLASTY AUGMENTATION Bilateral 1992    OTHER SURGICAL HISTORY      2016 knee arthroscopymeniscus tear    ZZC ENLARGE BREAST      Description: Breast Surgery Enlargement Procedure;  Recorded: 04/07/2008;       Social History     Tobacco Use    Smoking status: Never    Smokeless tobacco: Never   Substance Use Topics    Alcohol use: Yes     Alcohol/week: 5.0 standard drinks of alcohol     Family History   Problem Relation Age of Onset    Breast Cancer Paternal Grandmother 55    Breast Cancer Maternal Aunt 55    Heart Disease Maternal Grandmother     Breast Cancer Paternal Aunt         had breast removed. not sure if it was cancer    Clotting Disorder No family hx of          Current Outpatient Medications   Medication Sig Dispense Refill    levothyroxine (SYNTHROID/LEVOTHROID) 100 MCG tablet Take 1 tablet (100 mcg) by mouth daily. 90 tablet 0    SUMAtriptan (IMITREX) 100 MG tablet Take 1 tablet (100 mg) by mouth at onset of headache for migraine. 27 tablet 0     Allergies   Allergen Reactions    Diltiazem Unknown    Topamax [Topiramate] Unknown     Recent Labs   Lab Test 07/17/24  1029 05/29/24  0922 04/18/24  1431 03/06/23  1548 04/26/22  1129 02/15/22  0944   LDL  --   --  202* 172*  --  156*   HDL  --   --  83 94  --  73   TRIG  --   --  117 75  --  101   TSH 3.50 17.60* 9.84* 0.53   < > 0.12*    < > = values in this interval not displayed.      Current providers sharing in care for this patient include:  Patient Care Team:  Summer Finch MD as PCP - General (Family Practice)  Summer Finch MD as Assigned PCP  Armando Mart DO as Assigned Neuroscience Provider    The following health maintenance items are reviewed in Epic and correct as of today:  Health Maintenance   Topic Date Due    PNEUMOCOCCAL VACCINE 50+ YEARS (1 of 1 - PCV) Never done    ZOSTER VACCINE (1 of 2) Never done    COVID-19 VACCINE (3 - 2024-25 season) 09/01/2024    MAMMO SCREENING  09/13/2024    DIABETES SCREENING  02/15/2025  "   DEXA  03/15/2025    MEDICARE ANNUAL WELLNESS VISIT  04/18/2025    LIPID  04/18/2025    ANNUAL REVIEW OF HM ORDERS  04/18/2025    TSH W/FREE T4 REFLEX  07/17/2025    INFLUENZA VACCINE (1) 09/01/2025    FALL RISK ASSESSMENT  07/02/2026    COLORECTAL CANCER SCREENING  07/19/2028    ADVANCE CARE PLANNING  04/18/2029    DTAP/TDAP/TD VACCINE (3 - Td or Tdap) 12/01/2031    RSV VACCINE (1 - 1-dose 75+ series) 10/13/2032    PHQ-2 (once per calendar year)  Completed    HEPATITIS C SCREENING  Addressed    HPV VACCINE  Aged Out    MENINGITIS VACCINE  Aged Out    PAP  Discontinued         Review of Systems  Constitutional, neuro, ENT, endocrine, pulmonary, cardiac, gastrointestinal, genitourinary, musculoskeletal, integument and psychiatric systems are negative, except as otherwise noted.     Objective    Exam  /82   Pulse 73   Temp 98.3  F (36.8  C) (Oral)   Resp 16   Ht 1.562 m (5' 1.5\")   Wt 61.2 kg (135 lb)   SpO2 98%   BMI 25.09 kg/m     Estimated body mass index is 25.09 kg/m  as calculated from the following:    Height as of this encounter: 1.562 m (5' 1.5\").    Weight as of this encounter: 61.2 kg (135 lb).    Physical Exam  GENERAL: alert and no distress  EYES: Eyes grossly normal to inspection, PERRL and conjunctivae and sclerae normal  HENT: ear canals and TM's normal, nose and mouth without ulcers or lesions  NECK: no adenopathy, no asymmetry, masses, or scars  RESP: lungs clear to auscultation - no rales, rhonchi or wheezes  CV: regular rate and rhythm, normal S1 S2, no S3 or S4, no murmur, click or rub, no peripheral edema  ABDOMEN: soft, nontender, no hepatosplenomegaly, no masses and bowel sounds normal  MS: no gross musculoskeletal defects noted, no edema; no swelling of the knees, no effusion, good range of motion without crepitus.  Larisa's negative.  Anterior drawer negative.  Stresses of MCL and LCL negative.  SKIN: no suspicious lesions or rashes  NEURO: Normal strength and tone, mentation " intact and speech normal  PSYCH: mentation appears normal, affect normal/bright        7/2/2025   Mini Cog   Clock Draw Score 2 Normal   3 Item Recall 3 objects recalled   Mini Cog Total Score 5              Signed Electronically by: Summer Finch MD

## 2025-07-03 ENCOUNTER — PATIENT OUTREACH (OUTPATIENT)
Dept: CARE COORDINATION | Facility: CLINIC | Age: 70
End: 2025-07-03
Payer: COMMERCIAL

## 2025-07-03 ENCOUNTER — RESULTS FOLLOW-UP (OUTPATIENT)
Dept: FAMILY MEDICINE | Facility: CLINIC | Age: 70
End: 2025-07-03

## 2025-07-21 ENCOUNTER — PATIENT OUTREACH (OUTPATIENT)
Dept: CARE COORDINATION | Facility: CLINIC | Age: 70
End: 2025-07-21
Payer: COMMERCIAL